# Patient Record
Sex: MALE | URBAN - METROPOLITAN AREA
[De-identification: names, ages, dates, MRNs, and addresses within clinical notes are randomized per-mention and may not be internally consistent; named-entity substitution may affect disease eponyms.]

---

## 2022-03-01 ENCOUNTER — INPATIENT (INPATIENT)
Facility: HOSPITAL | Age: 22
LOS: 5 days | Discharge: HOME | DRG: 885 | End: 2022-03-07
Attending: PSYCHIATRY & NEUROLOGY | Admitting: PSYCHIATRY & NEUROLOGY
Payer: COMMERCIAL

## 2022-03-01 VITALS
OXYGEN SATURATION: 95 % | TEMPERATURE: 98 F | RESPIRATION RATE: 17 BRPM | WEIGHT: 175.05 LBS | DIASTOLIC BLOOD PRESSURE: 75 MMHG | HEART RATE: 112 BPM | SYSTOLIC BLOOD PRESSURE: 134 MMHG

## 2022-03-01 DIAGNOSIS — F39 UNSPECIFIED MOOD [AFFECTIVE] DISORDER: ICD-10-CM

## 2022-03-01 LAB
ALBUMIN SERPL ELPH-MCNC: 4.6 G/DL — SIGNIFICANT CHANGE UP (ref 3.3–5)
ALP SERPL-CCNC: 61 U/L — SIGNIFICANT CHANGE UP (ref 40–120)
ALT FLD-CCNC: 22 U/L — SIGNIFICANT CHANGE UP (ref 10–45)
AMPHET UR-MCNC: NEGATIVE — SIGNIFICANT CHANGE UP
ANION GAP SERPL CALC-SCNC: 9 MMOL/L — SIGNIFICANT CHANGE UP (ref 5–17)
APPEARANCE UR: CLEAR — SIGNIFICANT CHANGE UP
AST SERPL-CCNC: 21 U/L — SIGNIFICANT CHANGE UP (ref 10–40)
BARBITURATES UR SCN-MCNC: NEGATIVE — SIGNIFICANT CHANGE UP
BASOPHILS # BLD AUTO: 0.03 K/UL — SIGNIFICANT CHANGE UP (ref 0–0.2)
BASOPHILS NFR BLD AUTO: 0.5 % — SIGNIFICANT CHANGE UP (ref 0–2)
BENZODIAZ UR-MCNC: NEGATIVE — SIGNIFICANT CHANGE UP
BILIRUB SERPL-MCNC: 0.5 MG/DL — SIGNIFICANT CHANGE UP (ref 0.2–1.2)
BILIRUB UR-MCNC: NEGATIVE — SIGNIFICANT CHANGE UP
BUN SERPL-MCNC: 14 MG/DL — SIGNIFICANT CHANGE UP (ref 7–23)
CALCIUM SERPL-MCNC: 9.7 MG/DL — SIGNIFICANT CHANGE UP (ref 8.4–10.5)
CHLORIDE SERPL-SCNC: 100 MMOL/L — SIGNIFICANT CHANGE UP (ref 96–108)
CO2 SERPL-SCNC: 28 MMOL/L — SIGNIFICANT CHANGE UP (ref 22–31)
COCAINE METAB.OTHER UR-MCNC: NEGATIVE — SIGNIFICANT CHANGE UP
COLOR SPEC: YELLOW — SIGNIFICANT CHANGE UP
CREAT SERPL-MCNC: 0.95 MG/DL — SIGNIFICANT CHANGE UP (ref 0.5–1.3)
DIFF PNL FLD: NEGATIVE — SIGNIFICANT CHANGE UP
EGFR: 118 ML/MIN/1.73M2 — SIGNIFICANT CHANGE UP
EOSINOPHIL # BLD AUTO: 0.05 K/UL — SIGNIFICANT CHANGE UP (ref 0–0.5)
EOSINOPHIL NFR BLD AUTO: 0.9 % — SIGNIFICANT CHANGE UP (ref 0–6)
ETHANOL SERPL-MCNC: <10 MG/DL — SIGNIFICANT CHANGE UP (ref 0–10)
GLUCOSE SERPL-MCNC: 95 MG/DL — SIGNIFICANT CHANGE UP (ref 70–99)
GLUCOSE UR QL: NEGATIVE — SIGNIFICANT CHANGE UP
HCT VFR BLD CALC: 42.3 % — SIGNIFICANT CHANGE UP (ref 39–50)
HGB BLD-MCNC: 14.3 G/DL — SIGNIFICANT CHANGE UP (ref 13–17)
IMM GRANULOCYTES NFR BLD AUTO: 0.3 % — SIGNIFICANT CHANGE UP (ref 0–1.5)
KETONES UR-MCNC: NEGATIVE — SIGNIFICANT CHANGE UP
LEUKOCYTE ESTERASE UR-ACNC: NEGATIVE — SIGNIFICANT CHANGE UP
LYMPHOCYTES # BLD AUTO: 1.47 K/UL — SIGNIFICANT CHANGE UP (ref 1–3.3)
LYMPHOCYTES # BLD AUTO: 25.2 % — SIGNIFICANT CHANGE UP (ref 13–44)
MCHC RBC-ENTMCNC: 31 PG — SIGNIFICANT CHANGE UP (ref 27–34)
MCHC RBC-ENTMCNC: 33.8 GM/DL — SIGNIFICANT CHANGE UP (ref 32–36)
MCV RBC AUTO: 91.6 FL — SIGNIFICANT CHANGE UP (ref 80–100)
METHADONE UR-MCNC: NEGATIVE — SIGNIFICANT CHANGE UP
MONOCYTES # BLD AUTO: 0.5 K/UL — SIGNIFICANT CHANGE UP (ref 0–0.9)
MONOCYTES NFR BLD AUTO: 8.6 % — SIGNIFICANT CHANGE UP (ref 2–14)
NEUTROPHILS # BLD AUTO: 3.77 K/UL — SIGNIFICANT CHANGE UP (ref 1.8–7.4)
NEUTROPHILS NFR BLD AUTO: 64.5 % — SIGNIFICANT CHANGE UP (ref 43–77)
NITRITE UR-MCNC: NEGATIVE — SIGNIFICANT CHANGE UP
NRBC # BLD: 0 /100 WBCS — SIGNIFICANT CHANGE UP (ref 0–0)
OPIATES UR-MCNC: NEGATIVE — SIGNIFICANT CHANGE UP
PCP SPEC-MCNC: SIGNIFICANT CHANGE UP
PCP UR-MCNC: NEGATIVE — SIGNIFICANT CHANGE UP
PH UR: 7 — SIGNIFICANT CHANGE UP (ref 5–8)
PLATELET # BLD AUTO: 313 K/UL — SIGNIFICANT CHANGE UP (ref 150–400)
POTASSIUM SERPL-MCNC: 4.2 MMOL/L — SIGNIFICANT CHANGE UP (ref 3.5–5.3)
POTASSIUM SERPL-SCNC: 4.2 MMOL/L — SIGNIFICANT CHANGE UP (ref 3.5–5.3)
PROT SERPL-MCNC: 7.3 G/DL — SIGNIFICANT CHANGE UP (ref 6–8.3)
PROT UR-MCNC: NEGATIVE MG/DL — SIGNIFICANT CHANGE UP
RBC # BLD: 4.62 M/UL — SIGNIFICANT CHANGE UP (ref 4.2–5.8)
RBC # FLD: 13.2 % — SIGNIFICANT CHANGE UP (ref 10.3–14.5)
SARS-COV-2 RNA SPEC QL NAA+PROBE: SIGNIFICANT CHANGE UP
SODIUM SERPL-SCNC: 137 MMOL/L — SIGNIFICANT CHANGE UP (ref 135–145)
SP GR SPEC: 1.02 — SIGNIFICANT CHANGE UP (ref 1–1.03)
THC UR QL: POSITIVE
UROBILINOGEN FLD QL: 0.2 E.U./DL — SIGNIFICANT CHANGE UP
WBC # BLD: 5.84 K/UL — SIGNIFICANT CHANGE UP (ref 3.8–10.5)
WBC # FLD AUTO: 5.84 K/UL — SIGNIFICANT CHANGE UP (ref 3.8–10.5)

## 2022-03-01 PROCEDURE — 90792 PSYCH DIAG EVAL W/MED SRVCS: CPT

## 2022-03-01 PROCEDURE — 99285 EMERGENCY DEPT VISIT HI MDM: CPT

## 2022-03-01 RX ORDER — ESCITALOPRAM OXALATE 10 MG/1
20 TABLET, FILM COATED ORAL DAILY
Refills: 0 | Status: DISCONTINUED | OUTPATIENT
Start: 2022-03-02 | End: 2022-03-07

## 2022-03-01 RX ORDER — CLONAZEPAM 1 MG
0.5 TABLET ORAL DAILY
Refills: 0 | Status: DISCONTINUED | OUTPATIENT
Start: 2022-03-01 | End: 2022-03-02

## 2022-03-01 RX ORDER — HYDROXYZINE HCL 10 MG
25 TABLET ORAL EVERY 6 HOURS
Refills: 0 | Status: DISCONTINUED | OUTPATIENT
Start: 2022-03-01 | End: 2022-03-07

## 2022-03-01 RX ORDER — INFLUENZA VIRUS VACCINE 15; 15; 15; 15 UG/.5ML; UG/.5ML; UG/.5ML; UG/.5ML
0.5 SUSPENSION INTRAMUSCULAR ONCE
Refills: 0 | Status: DISCONTINUED | OUTPATIENT
Start: 2022-03-01 | End: 2022-03-07

## 2022-03-01 RX ORDER — OLANZAPINE 15 MG/1
5 TABLET, FILM COATED ORAL AT BEDTIME
Refills: 0 | Status: DISCONTINUED | OUTPATIENT
Start: 2022-03-01 | End: 2022-03-07

## 2022-03-01 RX ORDER — ACETAMINOPHEN 500 MG
650 TABLET ORAL EVERY 6 HOURS
Refills: 0 | Status: DISCONTINUED | OUTPATIENT
Start: 2022-03-01 | End: 2022-03-07

## 2022-03-01 RX ORDER — NICOTINE POLACRILEX 2 MG
4 GUM BUCCAL EVERY 4 HOURS
Refills: 0 | Status: DISCONTINUED | OUTPATIENT
Start: 2022-03-01 | End: 2022-03-07

## 2022-03-01 RX ADMIN — OLANZAPINE 5 MILLIGRAM(S): 15 TABLET, FILM COATED ORAL at 21:31

## 2022-03-01 RX ADMIN — Medication 4 MILLIGRAM(S): at 19:14

## 2022-03-01 RX ADMIN — Medication 50 MILLIGRAM(S): at 21:33

## 2022-03-01 NOTE — ED ADULT NURSE NOTE - CHIEF COMPLAINT QUOTE
Pt brought in by parents for [facility] admission. Reports writing a suicide note last week and roommate found pt on the roof of the building. Denies hx of suicide attempts. Taking lexapro, klonopin. Reports using etoh, marijuana daily, last use last night. Reports SI at this time, denies HI/AH/VH.

## 2022-03-01 NOTE — ED BEHAVIORAL HEALTH ASSESSMENT NOTE - DESCRIPTION
Lived with parents in [place] until Sept 2021 when he moved to [facility] to attend [employer]; single, never , no children. labs, vitals, EKG, psych consult. none known

## 2022-03-01 NOTE — ED BEHAVIORAL HEALTH ASSESSMENT NOTE - HPI (INCLUDE ILLNESS QUALITY, SEVERITY, DURATION, TIMING, CONTEXT, MODIFYING FACTORS, ASSOCIATED SIGNS AND SYMPTOMS)
Mr. 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     Upon assessment, patient reports 2+ year history of depression, 1.5 year history of SI (varying plans: gun, cutting, etc; no intent), worsening SI the past 4-6 months after moving to [place] for school in Sept and the ending of a 4 year romantic relationship in October. States he was experiencing SI on a daily basis (again, with varying plans) and states "On Sunday, I just decided that was the day." He wrote a suicide note, left it for his roommate to find, and went to the roof of his dorm room with the intention of jumping to his death. His roommate found him standing on the roof ledge and stopped him. However, he states he doesn't know if he would have jumped even if his roommate hadn't stopped him. "I was thinking about my younger brother. If I die, I'm leaving him all alone." Unable to verbalize why he felt the urge to act n his SI that day. Denies any previous SA/NSSIB. Reports during the months leading up to the interrupted SA, increased anhedonia, insomnia, social isolation, and emotional numbness. "I don't know what the point of being [alive] is." While he reports continued passive SI, denies intention to harm himself while in the hospital saying "I don't want to die in a hospital."    Psych history: Past psychotherapy May 2021-August 2021 (had to stop due to moving to from [place] to [place] for school). Has seen a psychiatrist since May 2021, recently seeing Dr. Leelee Sni since November 2021. Currently adherent with home medication regimen: Lexapro 20mg PO daily (since May 2021), Zyprexa 5mg (was going to be increased to 7.5mg, but patient hadn't started new dose yet) PO qHS (since Jan 2022), and Klonopin 0.5mg PO daily prn (since Feb 2022, reports taking 5/7 days a week in the AM). Doesn't feel his medication regimen has been helping his depressive symptoms. Denies past psychiatric hospitalizations or suicide attempts.     Family Psych Hx: paternal grandfather is bipolar, required frequent hospitalizations and attempted suicide multiple times during his lifetime. Maternal grandfather has substance use issues and OCD.     Substance Use: Nicotine Vapes - [id number]; THC - daily at night to help with sleep. Reports cutting back the past two months and stopping completely a week ago; Alcohol - Since beginning of Jan (when started decreasing THC use), started drinking vodka nightly until intoxicated. Reports occasional vomiting, denies blacking out. Denies other substance use.    While patient feels he would be safe to go home, he desires help for his depression and suicidality and is amenable to inpatient admission. Mr. 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     Upon assessment, patient reports 2+ year history of depression, 1.5 year history of SI (varying plans: gun, cutting, etc; no intent), worsening SI the past 4-6 months after moving to [place] for school in Sept and the ending of a 4 year romantic relationship in October. States he was experiencing SI on a daily basis (again, with varying plans) and states "On Sunday, I just decided that was the day." He wrote a suicide note, left it for his roommate to find, and went to the roof of his dorm room with the intention of jumping to his death. His roommate found him standing on the roof ledge and stopped him. However, he states he doesn't know if he would have jumped even if his roommate hadn't stopped him. "I was thinking about my younger brother. If I die, I'm leaving him all alone." Unable to verbalize why he felt the urge to act n his SI that day. Denies any previous SA/NSSIB. Reports during the months leading up to the interrupted SA, increased anhedonia, insomnia, social isolation, and emotional numbness. "I don't know what the point of being [alive] is." While he reports continued passive SI, denies intention to harm himself while in the hospital saying "I don't want to die in a hospital."    Psych history: Past psychotherapy May 2021-August 2021 (had to stop due to moving to from [place] to [place] for school). Has seen a psychiatrist since May 2021, recently seeing Dr. Leelee Sin since November 2021. Currently adherent with home medication regimen: Lexapro 20mg PO daily (since May 2021), Zyprexa 5mg (was going to be increased to 7.5mg, but patient hadn't started new dose yet) PO qHS (since Jan 2022), and Klonopin 0.5mg PO daily prn (since Feb 2022, reports taking 5/7 days a week in the AM). Doesn't feel his medication regimen has been helping his depressive symptoms. Denies past psychiatric hospitalizations or suicide attempts.     Family Psych Hx: paternal grandfather is bipolar, required frequent hospitalizations and attempted suicide multiple times during his lifetime. Maternal grandfather has substance use issues and OCD.     Substance Use: Nicotine Vapes - [id number]; THC - daily at night to help with sleep. Reports cutting back the past two months and stopping completely a week ago; Alcohol - Since beginning of Jan (when started decreasing THC use), started drinking vodka nightly until intoxicated. Reports occasional vomiting, denies blacking out. Denies other substance use.    Collateral: Patient gives verbal consent to speak with parents, psychiatrist, and [facility] mental health center. Writer spoke with patient's parents (Jovan and Marina) with patient's permission. The report knowing of their son's struggles with depression and confirm he had presented as increasingly depressed after his break up with girlfriend of four years this past fall. However, they were shocked to learn of his recent interrupted suicide attempt. Denied having previous safety concerns or knowledge of other past self harm behavior. Reported patient had medical marijuana card and smoked frequently, but that he was cutting back at the suggestion of his psychiatrist Dr. Sin.     PA student called Dr. Sin ([phone]) and left a voicemail.    Writer spoke with Lucia Wynn at [facility] ([phone]) regarding patient referral. Patient required to come to hospital for assessment prior to returning to college dormitory.     While patient feels he would be safe to go home, he desires help for his depression and suicidality and is amenable to inpatient admission.

## 2022-03-01 NOTE — ED PROVIDER NOTE - CLINICAL SUMMARY MEDICAL DECISION MAKING FREE TEXT BOX
Pt hemodynamically stable. No medical complaints. Reports SI with thwarted attempt 1wk ago. Accompanied by parents today. Placed on 1:1. Labs unremarkable. Psychiatry consulted. Will admit to Psychiatry. Pt hemodynamically stable. No medical complaints. Reports SI with thwarted attempt 1wk ago. Accompanied by parents today. Placed on 1:1. Labs unremarkable. COVID neg. Psychiatry consulted. Will admit to Psychiatry.

## 2022-03-01 NOTE — ED ADULT NURSE NOTE - HPI (INCLUDE ILLNESS QUALITY, SEVERITY, DURATION, TIMING, CONTEXT, MODIFYING FACTORS, ASSOCIATED SIGNS AND SYMPTOMS)
22 y/o male came in with suicidal attempt last week, "I wanted to jump off the roof of my building, that was my plan, my roommate found me. this was my first attempt to do anything like this." As per mom, "a suicide note was written." pt denies hallucinations (auditory and visual), Pt has hx of depression and anxiety.

## 2022-03-01 NOTE — ED BEHAVIORAL HEALTH ASSESSMENT NOTE - THOUGHT ASSOCIATIONS
The patient has been re-examined and I agree with the above assessment or I updated with my findings. Normal

## 2022-03-01 NOTE — ED PROVIDER NOTE - NS ED ROS FT
Constitutional: No fever. No chills.  Eyes: No redness. No discharge. No vision change.   ENT: No sore throat. No ear pain.  Cardiovascular: No chest pain. No leg swelling.  Respiratory: No cough. No shortness of breath.  GI: No abdominal pain. No vomiting. No diarrhea.   MSK: No joint pain. No back pain.   Skin: No rash. No abrasions.   Neuro: No numbness. No weakness.   Psych: +anxiety. +depression. +SI. No HI or AVH.

## 2022-03-01 NOTE — BH PATIENT PROFILE - FALL HARM RISK - UNIVERSAL INTERVENTIONS
Bed in lowest position, wheels locked, appropriate side rails in place/Call bell, personal items and telephone in reach/Instruct patient to call for assistance before getting out of bed or chair/Non-slip footwear when patient is out of bed/[place] to call system/Physically safe environment - no spills, clutter or unnecessary equipment/Purposeful Proactive Rounding/Room/bathroom lighting operational, light cord in reach

## 2022-03-01 NOTE — ED ADULT TRIAGE NOTE - CHIEF COMPLAINT QUOTE
Pt brought in by parents for [facility] admission. Reports writing a suicide note last week and roommate found pt on the roof of the building. Denies hx of suicide attempts. Taking lexapro, klonopin. Reports using etoh, marijuana daily. Reports SI at this time, denies HI/AH/VH. Pt brought in by parents for [facility] admission. Reports writing a suicide note last week and roommate found pt on the roof of the building. Denies hx of suicide attempts. Taking lexapro, klonopin. Reports using etoh, marijuana daily, last use last night. Reports SI at this time, denies HI/AH/VH.

## 2022-03-01 NOTE — ED ADULT NURSE REASSESSMENT NOTE - NS ED NURSE REASSESS COMMENT FT1
pt assessed, pt A&Ox4, pt denies any suicidal thoughts at this time, 1:1 continued, safety and comfort maintained, will continue to monitor.

## 2022-03-01 NOTE — ED BEHAVIORAL HEALTH ASSESSMENT NOTE - SUMMARY
Mr. 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    RECOMMENDATIONS  -- Pending negative COVID PCR, admit voluntary to [facility].  -- Restarted home medications, consider changing due to poor efficacy per patient.   -- CIWA protocol due to daily drinking  -- No 1:1 needed on [facility]; maintain while in ED. Mr. 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    Patient describes history of depression and chronic suicidality which worsened the past six months r/t life stressors (move out of his parents' house, romantic break up). High lethality of suicide plan (jumping from dorm roof) and largely unknown factor that motivated the patient to act on his chronic suicidality that day is concerning. While patient is reportedly adherent with medication regimen, depressive symptoms are not well controlled. Patient considered to be at a high risk of harm to self and would benefit from inpatient admission for safety, symptoms management, and medication optimization. Patient amenable to be admitted voluntarily.     RECOMMENDATIONS  -- Pending negative COVID PCR, admit voluntary to [facility].  -- Restarted home medications, consider changing per primary team due to poor efficacy.   -- CIWA protocol due to daily drinking.  -- NRT due to heavy nicotine use (vapes [id number]).  -- No 1:1 needed on [facility]; maintain while in ED.  -- Gain collateral from outpatient psychiatrist Dr. Sin ([phone]).

## 2022-03-01 NOTE — ED PROVIDER NOTE - OBJECTIVE STATEMENT
21yoM with pmhx of anxiety and depression presents with increased depression and SI. 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 He was found by his roommate. He reports increased depression and SI w/ plan (slit wrists, go to forest with gun) over past 4mo since breaking up with his girlfriend. He denies AVH or HI. No prior SA. He was previously followed by a psychiatrist and therapist in [place], now seeing a psychiatrist in [facility]. Currently on klonopin only. He reports daily marijuana use, 1-2 etoh beverages and vaping.

## 2022-03-01 NOTE — ED BEHAVIORAL HEALTH ASSESSMENT NOTE - CASE SUMMARY
atraumatic
20yo man, student at [employer], with a history of depression and anxiety, alcohol and cannabis use, no history of psychiatric admissions or suicide attempts, engaged in outpt psychiatric care, who presents referred by [facility] with escalating depression xmonths and aborted suicide attempt on 2/20. Pt is dysphoric and anxious appearing, endorsing ongoing distressing suicidal thoughts with theoretical plans, no current intent, seeking inpatient psychiatric care. Describes h/o perceptual disturbances and “paranoia” while smoking cannabis, denies other subjective experience of psychosis or any suggestive of jennie. Denies h/o alcohol withdrawal sx. Discussed process of 9.13 admission and paperwork completed. Questions answered.

## 2022-03-01 NOTE — ED BEHAVIORAL HEALTH ASSESSMENT NOTE - DOMICILE TYPE
"Chief Complaint   Patient presents with     Pain in toe and joint       Initial /80 (BP Location: Right arm, Patient Position: Sitting, Cuff Size: Adult Regular)   Pulse 63   Temp 97.9  F (36.6  C) (Tympanic)   Wt 49 kg (108 lb)   SpO2 99%   BMI 20.41 kg/m   Estimated body mass index is 20.41 kg/m  as calculated from the following:    Height as of 9/16/20: 1.549 m (5' 1\").    Weight as of this encounter: 49 kg (108 lb).  Medication Reconciliation: complete  Keeley Garvin LPN    "
Private Residence

## 2022-03-01 NOTE — ED BEHAVIORAL HEALTH ASSESSMENT NOTE - RISK ASSESSMENT
RF: male gender, psych dx, High Acute Suicide Risk high acute suicide risk  RF: male gender, psych dx, recent SI with aborted attempt, h/o substance use  modifiable RF: active alcohol and cannabis use  protective factors: family support, relationship with brother, engaged in school, in outpt  treatment

## 2022-03-01 NOTE — ED PROVIDER NOTE - PHYSICAL EXAMINATION
VITAL SIGNS: I have reviewed nursing notes and confirm.  CONSTITUTIONAL: Well-developed; in no acute distress.   SKIN:  warm and dry, no acute rash.   HEAD:  normocephalic, atraumatic.  EYES: PERRL, EOM intact; conjunctiva and sclera clear.  ENT: No nasal discharge; airway clear.   NECK: Supple; non tender.  CARD: S1, S2 normal; no murmurs, gallops, or rubs. Regular rate and rhythm.   RESP:  Clear to auscultation b/l, no wheezes, rales or rhonchi.  ABD: Normal bowel sounds; soft; non-distended; non-tender; no guarding/ rebound.  EXT: Normal ROM. No clubbing, cyanosis or edema. 2+ pulses to b/l ue/le.  NEURO: Alert, oriented, grossly unremarkable  PSYCH: Cooperative, flat affect, depressed mood.

## 2022-03-01 NOTE — ED PROVIDER NOTE - ATTENDING CONTRIBUTION TO CARE
21 M hx anxiety depression p/w SI with thwarted attempt 1 week ago climbing to roof.  Pt is student at [employer].  New breakup with GF months ago.  No HI.  On Klonopin and drinks ETOH daily, uses marijuana and vapes.  Affect flat, depressed appearing.  VSS, no acute medical condition apparent.  Will obtain medical clearance labs and consult psych.

## 2022-03-02 PROCEDURE — 99223 1ST HOSP IP/OBS HIGH 75: CPT

## 2022-03-02 RX ORDER — CLONAZEPAM 1 MG
0.5 TABLET ORAL
Refills: 0 | Status: DISCONTINUED | OUTPATIENT
Start: 2022-03-02 | End: 2022-03-07

## 2022-03-02 RX ADMIN — Medication 0.5 MILLIGRAM(S): at 21:54

## 2022-03-02 RX ADMIN — ESCITALOPRAM OXALATE 20 MILLIGRAM(S): 10 TABLET, FILM COATED ORAL at 13:38

## 2022-03-02 RX ADMIN — OLANZAPINE 5 MILLIGRAM(S): 15 TABLET, FILM COATED ORAL at 21:54

## 2022-03-02 NOTE — BH INPATIENT PSYCHIATRY ASSESSMENT NOTE - NSBHCHARTREVIEWVS_PSY_A_CORE FT
Vital Signs Last 24 Hrs  T(C): 36.4 (03-02-22 @ 09:09), Max: 37 (03-01-22 @ 18:40)  T(F): 97.5 (03-02-22 @ 09:09), Max: 98.6 (03-01-22 @ 18:40)  HR: 51 (03-02-22 @ 09:09) (51 - 76)  BP: 100/49 (03-02-22 @ 09:09) (100/49 - 132/79)  BP(mean): --  RR: 18 (03-02-22 @ 06:00) (17 - 18)  SpO2: 99% (03-02-22 @ 09:09) (97% - 99%)     Vital Signs Last 24 Hrs  T(C): 36.7 (03-02-22 @ 16:34), Max: 37 (03-01-22 @ 18:40)  T(F): 98.1 (03-02-22 @ 16:34), Max: 98.6 (03-01-22 @ 18:40)  HR: 63 (03-02-22 @ 16:34) (51 - 76)  BP: 119/73 (03-02-22 @ 16:34) (100/49 - 132/79)  BP(mean): --  RR: 18 (03-02-22 @ 16:34) (17 - 18)  SpO2: 99% (03-02-22 @ 16:34) (97% - 99%)     Vital Signs Last 24 Hrs  T(C): 36.6 (03-02-22 @ 20:11), Max: 36.7 (03-02-22 @ 16:34)  T(F): 97.8 (03-02-22 @ 20:11), Max: 98.1 (03-02-22 @ 16:34)  HR: 66 (03-02-22 @ 20:11) (51 - 76)  BP: 123/78 (03-02-22 @ 20:11) (100/49 - 123/78)  BP(mean): --  RR: 18 (03-02-22 @ 20:11) (18 - 18)  SpO2: 100% (03-02-22 @ 20:11) (98% - 100%)

## 2022-03-02 NOTE — BH PSYCHOLOGY - ADMISSION/NEUROPSYCHOLOGY NOTE - NSBHPSYCHOLID_PSY_A_CORE FT
Pt is a 22yo White cisgender male, heterosexual, student in [employer] at [employer], domiciled with roommate in apartment, past MHx of 4 concussions, currently hospitalized for an interrupted SA by jumping from the roof of his building in the context of increased depression and anxiety; PPHx of depression and anxiety as well as cannabis and alcohol use, no prior SA.    Pt seen during admission with team and later 1:1.

## 2022-03-02 NOTE — BH INPATIENT PSYCHIATRY ASSESSMENT NOTE - NSBHMETABOLIC_PSY_ALL_CORE_FT
BMI: BMI (kg/m2): 25 (03-01-22 @ 18:40)  HbA1c:   Glucose:   BP: 100/49 (03-02-22 @ 09:09) (100/49 - 134/75)  Lipid Panel:  BMI: BMI (kg/m2): 25 (03-01-22 @ 18:40)  HbA1c:   Glucose:   BP: 119/73 (03-02-22 @ 16:34) (100/49 - 134/75)  Lipid Panel:  BMI: BMI (kg/m2): 25 (03-01-22 @ 18:40)  HbA1c:   Glucose:   BP: 123/78 (03-02-22 @ 20:11) (100/49 - 134/75)  Lipid Panel:

## 2022-03-02 NOTE — BH INPATIENT PSYCHIATRY ASSESSMENT NOTE - HPI (INCLUDE ILLNESS QUALITY, SEVERITY, DURATION, TIMING, CONTEXT, MODIFYING FACTORS, ASSOCIATED SIGNS AND SYMPTOMS)
Patient is a 21 year old male from [place] currently enrolled at [employer] with a PPHx of depression and anxiety admitted to the unit following ED presentation at the recommendation of his [facility] mental health program. The patient reports that he had a suicide attempt last week - he wrote a note that he left in his dorm room and went to the roof of his building with the intent to jump. He was hesitant and his roommate found the note and came to the roof to stop him. Patient states that this was a more impulsive, "day of" decision, and he had thought of the plan to jump from his building prior but woke up in the morning with the plan to do it that day. The patient endorses that he has had suicidal ideation since May 2021 but it has been worse in the past 2 months. In the past, he has made a plan but has no prior attempts. He reports that he once told a close friend of his suicidal intent and plan and the friend "told him not to do it" but did not report to anyone else. The patient denies a history of manic symptoms and auditory or visual hallucinations. He currently feels that he would be safe to leave the hospital and that he would not attempt suicide again.    The patient reports a 2 year history of depression that has been worsening over the past 2-3 months. He has been feeling "numb" with depressed mood, decreased interest levels, low energy, decreased appetite, and difficulty focusing on school and managing school work and deadlines. He also reports trouble sleeping and that it often takes 1-2 hours for him to fall asleep due to anxiety and "racing thoughts." Patient endorses a history of anxiety for his "whole life" that was worst in high school but is still significant. He reports that his anxiety is worst right when he wakes up and in the evening. He endorses significant social anxiety that causes him to have difficulty connecting with people and to avoid social settings like parties. He started seeing a psychiatrist and therapist in [place] in 05/2021 who he saw until he moved to [place] for [employer] in 09/2021. The patient was started on Lexapro 5mg 05/2021 and endorses that it helped for 2-3 months but he feels it has stopped working despite dosage increases to 10mg then 20mg. At that time he was also prescribed Zyprexa for his sleep difficulties, which helps somewhat, and buspirone for his anxiety, which he did not feel was helpful. Upon moving to [place] the patient started seeing a new psychiatrist who continued the Lexapro and Zyprexa and discontinued the buspirone. He did not seek a new therapist as he did not enjoy his sessions in [place] and felt they made his anxiety and depression worse, though he has attended counseling with his college counseling program. It was the college counseling program who recommended that he come to the hospital he believes for "liability reasons."    The patient is from [facility] and reports a strained relationship with his parents, who he describes as "complacent, angry, and only thinking about themselves" and who he believes take their anger out on him. He has one younger brother, 16 years old, who he is very close to and wants to be there to support. He reports that wanting to be there for his brother is one of the main reasons he could not go through with his suicide attempt. His medical history is significant for 4 concussions (3 of which involved presentation to the ED) - 1 from being hit in the head with a baseball, 2 from playing flag football, and 1 other. Patient also reports that he was "hyperactive" as a child and was evaluated for ADHD but was not given a formal diagnosis and was never treated with medication. His family history is significant for bipolar disorder in his paternal grandfather who has made 3 suicide attempts, and substance abuse (opioids and alcohol) and OCD in his maternal grandfather.    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 Patient endorses that the program is high pressure with a demanding workload - the environment makes it difficult to become close to people in the program who he describes as "superficial and in their own bubble." Though the patient has been receiving good grades, he has been struggling lately to do his work and meet deadlines. 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 Patient reports marijuana and alcohol use since high school. Before 01/2022 this fall he was smoking marijuana 6-7x/day including when he first woke up and to help him sleep. He has decreased his use to 1x/day at night. He reports drinking 6-8oz of vodka/night, which makes him feel better and more open with more energy. Patient usually drinks with other people but states that he would drink alone if no one where there. Patient endorses that he wants to stop using marijuana and alcohol and has talked with his psychiatrist about how his substance use may be affecting his symptoms. His last use was the day prior to his presentation to the ED. Patient is a 21 year old male from [place] currently enrolled at [employer] with a history of depression and anxiety admitted to the unit following ED presentation at the recommendation of his [facility] mental health program. The patient reports that he had a suicide attempt last week - he wrote a note that he left in his dorm room and went to the roof of his building with the intent to jump. He was hesitant and his roommate found the note and came to the roof to stop him. Patient states that this was a more impulsive, "day-of decision," and he had thought of the plan to jump from his building prior but woke up in the morning with the plan to do it that day. The patient endorses that he has had suicidal ideation since May 2021 but it has been worse in the past 2 months. In the past, he has made a plan but has no prior attempts. He reports that he once told a close friend of his suicidal intent and plan and the friend "told him not to do it" but did not report to anyone else. The patient denies a history of manic symptoms and auditory or visual hallucinations. He currently feels that he would be safe to leave the hospital and that he would not attempt suicide again.    The patient reports a 2 year history of depression that has been worsening over the past 2-3 months. He has been feeling "numb" with depressed mood, decreased interest levels, low energy, decreased appetite, and difficulty focusing on school and managing school work and deadlines. He also reports trouble sleeping and that it often takes 1-2 hours for him to fall asleep due to anxiety and "racing thoughts." Patient endorses a history of anxiety for his "whole life" that was worst in high school but is still significant. He reports that his anxiety is worst right when he wakes up and in the evening. He endorses significant social anxiety that causes him to have difficulty connecting with people and to avoid social settings like parties. He started seeing a psychiatrist and therapist in [place] in 05/2021 whom he saw until he moved to [place] for [employer] in 09/2021. The patient was started on Lexapro 5mg 05/2021 and endorses that it helped for 2-3 months but he feels it has stopped working despite dosage increases to 10mg then 20mg. In 05/2021 he was also prescribed Zyprexa for his sleep difficulties, which helps somewhat, and buspirone for his anxiety, which he did not feel was helpful. Upon moving to [place] the patient started seeing a new psychiatrist who continued the Lexapro and Zyprexa and discontinued the buspirone. He did not seek a new therapist as he did not enjoy his sessions in [place] and felt they made his anxiety and depression worse, though he has attended counseling with his college counseling program. It was the college counseling program who recommended that he come to the hospital he believes for "liability reasons."    The patient is from [facility] and reports a strained relationship with his parents, whom he describes as "complacent, angry, and only thinking about themselves" and who he believes take their anger out on him. He has one younger brother, 16 years old, whom he is very close to and wants to be there to support. He reports that wanting to be there for his brother is one of the main reasons he could not go through with his interrupted suicide attempt. His medical history is significant for 4 concussions (3 of which involved presentation to the ED) - 1 from being hit in the head with a baseball, 2 from playing flag football, and 1 other. Patient also reports that he was "hyperactive" as a child and was evaluated for ADHD but was not given a formal diagnosis and was never treated with medication. His family history is significant for bipolar disorder and 3 suicide attempts in his paternal grandfather, and substance abuse (opioids and alcohol) and OCD in his maternal grandfather. He was not raised in a specific Tenriism tradition and does not associate with specific Tenriism or spiritual groups now.    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 Patient endorses that the program is high pressure with a demanding workload - the environment makes it difficult to become close to people in the program who he describes as "superficial and in their own bubble." Though the patient has been receiving good grades, he has been struggling lately to do his work and meet deadlines. 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 Patient reports marijuana and alcohol use since high school. Before 01/2022 this fall he was smoking marijuana 6-7x/day including when he first woke up and to help him sleep. He has decreased his use to 1x/day at night. He reports drinking 6-8oz of vodka/night, which makes him feel better and more open with more energy. Patient usually drinks with other people but states that he would drink alone if no one where there. Patient endorses that he wants to stop using marijuana and alcohol and has talked with his psychiatrist about how his substance use may be affecting his symptoms. His last use was the day prior to his presentation to the ED. Patient is a 21 year old male from [place] currently enrolled at "[employer] with a history of depression and anxiety admitted to the unit following ED presentation at the recommendation of his [facility] mental health program. The patient reports that he had a suicide attempt last week - he wrote a note that he left in his dorm room and went to the roof of his building with the intent to jump. He was hesitant and his roommate found the note and came to the roof to stop him. Patient states that this was a more impulsive, "day-of decision," and he had thought of the plan to jump from his building prior but woke up in the morning with the plan to do it that day. The patient endorses that he has had suicidal ideation since May 2021, but it has been worse in the past 2 months. In the past, he has had a plan but has no prior attempts. He reports that he once told a close friend of his suicidal intent and plan and the friend "told him not to do it" but did not report to anyone else. The patient denies a history of manic symptoms and auditory or visual hallucinations. He currently feels that he would be safe to leave the hospital and that he would not attempt suicide again.    The patient reports a 2-year history of depression that has been worsening over the past 2-3 months. He has been feeling "numb" with depressed mood, decreased interest levels, low energy, decreased appetite, and difficulty focusing on school and managing schoolwork and deadlines. He also reports trouble sleeping and that it often takes 1-2 hours for him to fall asleep due to anxiety and "racing thoughts."    Patient endorses a history of anxiety for his "whole life" that was worst in high school but is still significant. He reports that his anxiety is at its height right when he wakes up and in the evening. He endorses significant social anxiety that causes him to have difficulty connecting with people and to avoid social settings like parties. He started seeing a psychiatrist and therapist in [place] in 05/2021 whom he saw until he moved to [place] for [employer] in 09/2021. The patient was started on Lexapro 5mg 05/2021 and endorses that it helped for 2-3 months but he feels it has stopped working despite dosage increases to 10mg then 20mg. In 05/2021 he was also prescribed Zyprexa 5mg qpm for his sleep difficulties, which helps only a little, and buspirone for his anxiety, which he did not feel was helpful. Upon moving to [place] the patient started seeing a new psychiatrist (Nova Huerta, [phone]) who continued the Lexapro and Zyprexa and discontinued the buspirone. He did not seek a new therapist as he did not enjoy his sessions in [place] and felt they made his anxiety and depression worse, though he has attended counseling with his college counseling program. It was the college counseling program who recommended that he come to the hospital he believes for "liability reasons."    The patient is from [facility] and reports a strained relationship with his parents, whom he describes as "complacent, angry about everything, and only thinking about themselves" and who he believes take their anger out on him. He has one younger brother, 16 years old, whom he is very close to and wants to be there to support. He reports that wanting to be there for his brother is one of the main reasons he could not go through with his interrupted suicide attempt. His medical history is significant for 4 concussions (3 of which involved presentation to the ED) - 1 from being hit in the head with a baseball, 2 from playing flag football, and 1 other. Patient also reports that he was "hyperactive" as a child and was evaluated for ADHD but was not given a formal diagnosis and was never treated with medication. His family history is significant for bipolar disorder and 3 suicide attempts in his paternal grandfather, and substance abuse (opioids and alcohol) and OCD in his maternal grandfather. He was not raised in a specific Anabaptist tradition and does not associate with specific Anabaptist or spiritual groups now.    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 Patient endorses that the program is high pressure with a demanding workload - the environment makes it difficult to become close to people in the program who he describes as "superficial and in their own bubble." He feels lonely. Though the patient has been receiving good grades, he has been struggling lately to do his work and meet deadlines. 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 Patient reports marijuana and alcohol use since high school. Before 01/2022 this fall he was smoking marijuana 6-7x/day including when he first woke up and to help him sleep. He has decreased his use to 1x/day at night. He reports drinking 6-8oz of vodka/night, which makes him feel better and more open with more energy. Patient usually drinks with other people but states that he would drink alone if no one where there. Patient endorses that he wants to stop using marijuana and alcohol and has talked with his psychiatrist about how his substance use may be affecting his symptoms. His last use was the day prior to his presentation to the ED.    Collateral:  Attempt was made to contact Dr. Huerta. Left voice mail.

## 2022-03-02 NOTE — BH INPATIENT PSYCHIATRY ASSESSMENT NOTE - NSBHCONSBHPROVDETAILS_PSY_A_CORE  FT
Detail Level: Simple Additional Notes: Patient is on testosterone supplement 2/2 hypophysitis; avoiding finasteride. Continue minoxidil. outpatient psychiatrist called, unavailable Render Risk Assessment In Note?: yes

## 2022-03-02 NOTE — BH INPATIENT PSYCHIATRY ASSESSMENT NOTE - NSTXDEPRESINTERMD_PSY_ALL_CORE
Psychopharm management 15 minutes daily  Lexapro 20mg, Klon 1mg/day, Zyprexa 5mg nightly  Will encourage exploration of his feelings and motivation, developing healthy coping strategies not involving THC or alcohol

## 2022-03-02 NOTE — BH SOCIAL WORK INITIAL PSYCHOSOCIAL EVALUATION - NSBHHOUSECOMMENTFT_PSY_ALL_CORE
Patient lives with his family in [place] but is currently residing in the dorms at [employer] [employer] [facility]

## 2022-03-02 NOTE — BH PSYCHOLOGY - ADMISSION/NEUROPSYCHOLOGY NOTE - NSBHSUICIDERISKFT_PSY_ALL_CORE
Acute suicide risk is moderate to high, since pt reports no current SI but has a recent SA which was impulsive. Chronic risk is moderate as pt made an SA but is willing to engage in tx.  Static factors: recent SA, hx of depression and anxiety, hx of substance use, FPHx of bipolar and substance use and SAs  Modifiable factors: current mood episode, current mood episode, anxiety, lack of coping skills, strained relationship with parents  Protective factors: responsibility towards family (brother), engagement in school, future oriented

## 2022-03-02 NOTE — BH INPATIENT PSYCHIATRY ASSESSMENT NOTE - NSBHASSESSSUMMFT_PSY_ALL_CORE
Patient is a 20 yo M from [place] recently enrolled at [employer] with a history of depression and anxiety admitted following an interrupted suicide attempt last week. He reports a 2 year history of depression poorly managed with Lexapro and a lifelong history of anxiety. Patient endorses worsening depression over the past 2-3 months and passive and active SI since 05/2021 with no prior attempts. He has a history of heavy marijuana use and current marijuana and alcohol use. Patient presenting with depression and anxious mood with notable psychomotor agitation and discomfort.    Plan:  - continue current medication  - collect collateral from outpatient psychiatrist Patient is a 22 yo M from [place] recently enrolled at [employer] with a history of depression and anxiety admitted following an interrupted suicide attempt last week. He reports a 2 year history of depression poorly managed with Lexapro and a lifelong history of anxiety. Patient endorses worsening depression over the past 2-3 months and passive and active SI since 05/2021 with no prior attempts. He has a history of heavy marijuana use and current marijuana and alcohol use. Patient presenting with depression and anxious mood with notable psychomotor agitation and discomfort.    Plan:  - Continue home Lexapro 20mg daily for depression/anxiety  - Continue home Zyprexa 5mg nightly for sleep  - Raise Klonopin to 1mg daily for anxiety  - Collect collateral from outpatient psychiatrist, family   Patient is a 20 yo M from [place] recently enrolled at [employer] with a history of depression and anxiety admitted following an interrupted suicide attempt last week. He reports a 2 year history of depression poorly managed with Lexapro and a lifelong history of anxiety. Patient endorses worsening depression over the past 2-3 months and passive and active SI since 05/2021 with no prior attempts. He has a history of heavy marijuana use and current marijuana and alcohol use. Patient presenting with depression and anxious mood with notable psychomotor agitation and discomfort.    Plan:  - Continue home Lexapro 20mg daily for depression/anxiety  - Continue home Zyprexa 5mg nightly for sleep  - Raise Klonopin to 0.5mg BID for anxiety  - Collect collateral from outpatient psychiatrist, family

## 2022-03-02 NOTE — BH SOCIAL WORK INITIAL PSYCHOSOCIAL EVALUATION - DETAILS
Paternal grandfather had opioid and alcohol substance abuse Patient reported his paternal grandfather has bipolar disorder with three suicide attempts and substance use disorder (opioids and alcohol). Patient also reports his maternal grandfather has OCD.

## 2022-03-02 NOTE — CHART NOTE - NSCHARTNOTEFT_GEN_A_CORE
[facility]  PHYSICAL EXAM: Agree/Declined    VITALS: T(C): 36.6 (03-02-22 @ 06:00), Max: 37 (03-01-22 @ 18:40)  HR: 76 (03-02-22 @ 06:00) (64 - 112)  BP: 100/65 (03-02-22 @ 06:00) (100/65 - 134/75)  RR: 18 (03-02-22 @ 06:00) (17 - 18)  SpO2: 98% (03-02-22 @ 06:00) (95% - 98%)      GENERAL: NAD, comfortable, ambulating  HEAD:  Atraumatic, Normocephalic  EYES: EOMI, PERRLA, conjunctiva and sclera clear  ENT: Moist mucous membranes  NECK: Supple, No JVD  CHEST/LUNG: Clear to auscultation bilaterally; No rales, rhonchi, wheezing, or rubs. Unlabored respirations  HEART: Regular rate and rhythm; No murmurs, rubs, or gallops  ABDOMEN: BSx4; Soft, nontender, nondistended  EXTREMITIES:  No clubbing, cyanosis, or edema  NERVOUS SYSTEM:  A&Ox3, no focal deficits   SKIN: No rashes or lesions [facility]  PHYSICAL EXAM: Agree/Declined    VITALS: T(C): 36.6 (03-02-22 @ 06:00), Max: 37 (03-01-22 @ 18:40)  HR: 76 (03-02-22 @ 06:00) (64 - 112)  BP: 100/65 (03-02-22 @ 06:00) (100/65 - 134/75)  RR: 18 (03-02-22 @ 06:00) (17 - 18)  SpO2: 98% (03-02-22 @ 06:00) (95% - 98%)      GENERAL: NAD, comfortable, ambulating  HEAD:  Atraumatic, Normocephalic  EYES: EOMI, PERRLA, conjunctiva and sclera clear  ENT: Moist mucous membranes, no asymmetry, no trauma or lesions  NECK: Supple, No JVD, FROM  CHEST/LUNG: Clear to auscultation bilaterally; No rales, rhonchi, wheezing, or rubs. Unlabored respirations  HEART: Regular rate and rhythm; No murmurs, rubs, or gallops  ABDOMEN: BSx4; Soft, nontender, nondistended  EXTREMITIES:  No clubbing, cyanosis, or edema, FROM  NERVOUS SYSTEM:  A&Ox3, CNs in tact, strenght 5/5 b/l LE and UE, sensation intact  SKIN: No rashes or lesions PHYSICAL EXAM    VITALS: T(C): 36.6 (03-02-22 @ 06:00), Max: 37 (03-01-22 @ 18:40)  HR: 76 (03-02-22 @ 06:00) (64 - 112)  BP: 100/65 (03-02-22 @ 06:00) (100/65 - 134/75)  RR: 18 (03-02-22 @ 06:00) (17 - 18)  SpO2: 98% (03-02-22 @ 06:00) (95% - 98%)      GENERAL: NAD, comfortable, ambulating  HEAD:  Atraumatic, Normocephalic  EYES: EOMI, PERRLA, conjunctiva and sclera clear  ENT: Moist mucous membranes, no asymmetry, no trauma or lesions  NECK: Supple, No JVD, FROM  CHEST/LUNG: Clear to auscultation bilaterally; No rales, rhonchi, wheezing, or rubs. Unlabored respirations  HEART: Regular rate and rhythm; No murmurs, rubs, or gallops  ABDOMEN: BSx4; Soft, nontender, nondistended  EXTREMITIES:  No clubbing, cyanosis, or edema, FROM  NERVOUS SYSTEM:  A&Ox3, CNs in tact, strenght 5/5 b/l LE and UE, sensation intact  SKIN: No rashes or lesions    Reviewed by Dr. mahajan

## 2022-03-02 NOTE — BH PSYCHOLOGY - ADMISSION/NEUROPSYCHOLOGY NOTE - HPI (INCLUDE ILLNESS QUALITY, SEVERITY, DURATION, TIMING, CONTEXT, MODIFYING FACTORS, ASSOCIATED SIGNS AND SYMPTOMS)
Patient is a 21 year old male from [place] currently enrolled at [employer] with a history of depression and anxiety admitted to the unit following ED presentation at the recommendation of his [facility] mental health program. The patient reports that he had a suicide attempt last week - he wrote a note that he left in his dorm room and went to the roof of his building with the intent to jump. He was hesitant and his roommate found the note and came to the roof to stop him. Patient states that this was a more impulsive, "day-of decision," and he had thought of the plan to jump from his building prior but woke up in the morning with the plan to do it that day. The patient endorses that he has had suicidal ideation since May 2021 but it has been worse in the past 2 months. In the past, he has made a plan but has no prior attempts. He reports that he once told a close friend of his suicidal intent and plan and the friend "told him not to do it" but did not report to anyone else. The patient denies a history of manic symptoms and auditory or visual hallucinations. He currently feels that he would be safe to leave the hospital and that he would not attempt suicide again.    The patient reports a 2 year history of depression that has been worsening over the past 2-3 months. He has been feeling "numb" with depressed mood, decreased interest levels, low energy, decreased appetite, and difficulty focusing on school and managing school work and deadlines. He also reports trouble sleeping and that it often takes 1-2 hours for him to fall asleep due to anxiety and "racing thoughts." Patient endorses a history of anxiety for his "whole life" that was worst in high school but is still significant. He reports that his anxiety is worst right when he wakes up and in the evening. He endorses significant social anxiety that causes him to have difficulty connecting with people and to avoid social settings like parties. He started seeing a psychiatrist and therapist in [place] in 05/2021 whom he saw until he moved to [place] for [employer] in 09/2021. The patient was started on Lexapro 5mg 05/2021 and endorses that it helped for 2-3 months but he feels it has stopped working despite dosage increases to 10mg then 20mg. In 05/2021 he was also prescribed Zyprexa for his sleep difficulties, which helps somewhat, and buspirone for his anxiety, which he did not feel was helpful. Upon moving to [place] the patient started seeing a new psychiatrist who continued the Lexapro and Zyprexa and discontinued the buspirone. He did not seek a new therapist as he did not enjoy his sessions in [place] and felt they made his anxiety and depression worse, though he has attended counseling with his college counseling program. It was the college counseling program who recommended that he come to the hospital he believes for "liability reasons."    The patient is from [place] and reports a strained relationship with his parents, whom he describes as "complacent, angry, and only thinking about themselves" and who he believes take their anger out on him. He has one younger brother, 16 years old, whom he is very close to and wants to be there to support. He reports that wanting to be there for his brother is one of the main reasons he could not go through with his interrupted suicide attempt. His medical history is significant for 4 concussions (3 of which involved presentation to the ED) - 1 from being hit in the head with a baseball, 2 from playing flag football, and 1 other. Patient also reports that he was "hyperactive" as a child and was evaluated for ADHD but was not given a formal diagnosis and was never treated with medication. His family history is significant for bipolar disorder and 3 suicide attempts in his paternal grandfather, and substance abuse (opioids and alcohol) and OCD in his maternal grandfather. He was not raised in a specific Nondenominational tradition and does not associate with specific Nondenominational or spiritual groups now.    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 Patient endorses that the program is high pressure with a demanding workload - the environment makes it difficult to become close to people in the program who he describes as "superficial and in their own bubble." Though the patient has been receiving good grades, he has been struggling lately to do his work and meet deadlines. 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 Patient reports marijuana and alcohol use since high school. Before 01/2022 this fall he was smoking marijuana 6-7x/day including when he first woke up and to help him sleep. He has decreased his use to 1x/day at night. He reports drinking 6-8oz of vodka/night, which makes him feel better and more open with more energy. Patient usually drinks with other people but states that he would drink alone if no one where there. Patient endorses that he wants to stop using marijuana and alcohol and has talked with his psychiatrist about how his substance use may be affecting his symptoms. His last use was the day prior to his presentation to the ED.paternal grandfather - bipolar disorder, suicide attempts x3  maternal grandfather - substance abuse (opioids, alcohol), OCDPatient is a 22 yo M from [place] recently enrolled at [employer] with a history of depression and anxiety admitted following an interrupted suicide attempt last week. He reports a 2 year history of depression poorly managed with Lexapro and a lifelong history of anxiety. Patient endorses worsening depression over the past 2-3 months and passive and active SI since 05/2021 with no prior attempts. He has a history of heavy marijuana use and current marijuana and alcohol use. Patient presenting with depression and anxious mood with notable psychomotor agitation and discomfort. Pt admitted to the unit following ED presentation at the recommendation of his [facility] mental health program. He reports that he had a suicide attempt last week - he wrote a note that he left in his dorm room and went to the roof of his building with the intent to jump. Pt's roommate found the note and came to the roof to stop him, but pt also says that he was already hesitant because of the thought of abandoning his brother (15yo). Pt states that his SA was the result of an impulsive, "day-of decision," although he had SI with plan to jump from his building since May 2021 but it has been worse in the past 2 months. In the past, he has made a plan but has no prior attempts. He reports that he once told a close friend of his suicidal intent and plan and the friend "told him not to do it" but did not report to anyone else. The patient denies a history of manic symptoms and auditory or visual hallucinations. He currently feels that he would be safe to leave the hospital and that he would not attempt suicide again. Pt identifies his depression as the main trigger for his SI, which is exacerbated by his sadness and guilt related his breakup with ex-girlfriend of 4.5 y, as well as the stress of workload and deadlines related to school, as elaborated upon below. On the day of the attempt, pt received a text message from a girl he had one date with conveying that they would no longer be in touch, which may have contributed to his impulsive SI. Additionally, pt reports that he tends to ruminate, which also increases his SI.    The patient reports a 2 year history of depression that has been worsening over the past 2-3 months. He has been feeling "numb" with depressed mood, decreased interest levels, low energy, decreased appetite, and difficulty focusing on school and managing school work and deadlines, and reports some moments when his depression is attenuated. He also reports trouble sleeping and that it often takes 1-2 hours for him to fall asleep due to anxiety and "racing thoughts." Patient endorses a history of anxiety for his "whole life" that was worst in high school but is still significant. He reports that his anxiety is worst right when he wakes up and in the evening. He endorses significant social anxiety that causes him to have difficulty connecting with people and to avoid social settings like parties. He started seeing a psychiatrist and therapist in [place] in 05/2021 whom he saw until he moved to [place] for college in 09/2021. The patient was started on Lexapro 5mg 05/2021 and endorses that it helped for 2-3 months but he feels it has stopped working despite dosage increases to 10mg then 20mg. In 05/2021 he was also prescribed Zyprexa for his sleep difficulties, which helps somewhat, and buspirone for his anxiety, which he did not feel was helpful. Upon moving to [place] the patient started seeing a new psychiatrist who continued the Lexapro and Zyprexa and discontinued the buspirone. He did not seek a new therapist as he did not enjoy his sessions in [place] and felt they made his anxiety and depression worse, though he has attended counseling with his college counseling program. It was the [facility] counseling program who recommended that he come to the hospital he believes for "liability reasons."    Patient reports marijuana and alcohol use since high school. Before 01/2022 this fall he was smoking marijuana 6-7x/day including when he first woke up and to help him sleep. He has decreased his use to 1x/day at night. He reports drinking 6-8oz of vodka/night, which makes him feel better and more open with more energy. Patient usually drinks with other people but states that he would drink alone if no one where there. Patient endorses that he wants to stop using marijuana and alcohol and has talked with his psychiatrist about how his substance use may be affecting his symptoms. Pt reports that he decreased his marijuana use in the last couple of months, which may have affected his SI. His last use was the day prior to his presentation to the ED.    The patient is from [facility] and reports a strained relationship with his parents, whom he describes as "complacent, angry, and only thinking about themselves" and who he believes take their anger out on him. He reports that when he told his parents he made an SA, they were angry at him and did not understand his gesture. He has one younger brother, 16 years old, whom he is very close to and wants to be there to support. He reports that wanting to be there for his brother is one of the main reasons he could not go through with his interrupted suicide attempt. His medical history is significant for 4 concussions (3 of which involved presentation to the ED) - 1 from being hit in the head with a baseball, 2 from playing flag football, and 1 other. Patient also reports that he was "hyperactive" as a child and was evaluated for ADHD but was not given a formal diagnosis and was never treated with medication. His family history is significant for bipolar disorder and 3 suicide attempts in his paternal grandfather, and substance abuse (opioids and alcohol) and OCD in his maternal grandfather. He was not raised in a specific Rastafarian tradition and does not associate with specific Rastafarian or spiritual groups now.    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 Patient endorses that the program is high pressure with a demanding workload - the environment makes it difficult to become close to people in the program who he describes as "superficial and in their own bubble." Though the patient has been receiving good grades, he has been struggling lately to do his work and meet deadlines. 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     Pt denies jennie, AH/VH, hx of trauma

## 2022-03-02 NOTE — BH INPATIENT PSYCHIATRY ASSESSMENT NOTE - VIOLENCE PROTECTIVE FACTORS:
Residential stability/Relationship stability/Employment stability/Engagement in treatment Residential stability/Employment stability/Engagement in treatment

## 2022-03-02 NOTE — BH INPATIENT PSYCHIATRY ASSESSMENT NOTE - CASE SUMMARY
Pt in college program and is at [employer]. Had suicidal ideation with plan to jump off roof and wrote a note he left for loved ones and roommate. Lexapro 20 mg and zyprexa have not been fully helpful. Pt very much wants discharge from inpatient unit as soon as possible. Has outpt psychiatrist but no therapist. Recent breakup, and stresses at school have been stressors.

## 2022-03-02 NOTE — BH SOCIAL WORK INITIAL PSYCHOSOCIAL EVALUATION - NSPTSTATEDGOAL_PSY_ALL_CORE
Patient wants to be connected with a female therapist and obtain stabilization through medication management

## 2022-03-02 NOTE — BH INPATIENT PSYCHIATRY ASSESSMENT NOTE - CURRENT MEDICATION
MEDICATIONS  (STANDING):  escitalopram 20 milliGRAM(s) Oral daily  influenza   Vaccine 0.5 milliLiter(s) IntraMuscular once  OLANZapine 5 milliGRAM(s) Oral at bedtime    MEDICATIONS  (PRN):  acetaminophen     Tablet .. 650 milliGRAM(s) Oral every 6 hours PRN Temp greater or equal to 38C (100.4F), Mild Pain (1 - 3), Moderate Pain (4 - 6)  aluminum hydroxide/magnesium hydroxide/simethicone Suspension 30 milliLiter(s) Oral every 4 hours PRN Dyspepsia  chlordiazePOXIDE 50 milliGRAM(s) Oral every 4 hours PRN CIWA-Ar score 8 or greater (first line)  clonazePAM  Tablet 0.5 milliGRAM(s) Oral daily PRN anxiety  hydrOXYzine hydrochloride 25 milliGRAM(s) Oral every 6 hours PRN Anxiety  LORazepam     Tablet 2 milliGRAM(s) Oral every 6 hours PRN CIWA-Ar score increase by 2 points and a total score of 7 or less  LORazepam     Tablet 2 milliGRAM(s) Oral every 6 hours PRN CIWA-Ar score 8 or greater (second line)  nicotine  Polacrilex Gum 4 milliGRAM(s) Oral every 4 hours PRN nicotine withdrawal   MEDICATIONS  (STANDING):  clonazePAM  Tablet 0.5 milliGRAM(s) Oral two times a day  escitalopram 20 milliGRAM(s) Oral daily  influenza   Vaccine 0.5 milliLiter(s) IntraMuscular once  OLANZapine 5 milliGRAM(s) Oral at bedtime    MEDICATIONS  (PRN):  acetaminophen     Tablet .. 650 milliGRAM(s) Oral every 6 hours PRN Temp greater or equal to 38C (100.4F), Mild Pain (1 - 3), Moderate Pain (4 - 6)  aluminum hydroxide/magnesium hydroxide/simethicone Suspension 30 milliLiter(s) Oral every 4 hours PRN Dyspepsia  chlordiazePOXIDE 50 milliGRAM(s) Oral every 4 hours PRN CIWA-Ar score 8 or greater (first line)  hydrOXYzine hydrochloride 25 milliGRAM(s) Oral every 6 hours PRN Anxiety  LORazepam     Tablet 2 milliGRAM(s) Oral every 6 hours PRN CIWA-Ar score increase by 2 points and a total score of 7 or less  LORazepam     Tablet 2 milliGRAM(s) Oral every 6 hours PRN CIWA-Ar score 8 or greater (second line)  nicotine  Polacrilex Gum 4 milliGRAM(s) Oral every 4 hours PRN nicotine withdrawal

## 2022-03-02 NOTE — BH PSYCHOLOGY - ADMISSION/NEUROPSYCHOLOGY NOTE - SUMMARY
Patient is a 22 yo M from [place] recently enrolled at [employer] with a history of depression and anxiety admitted following an interrupted suicide attempt last week. He reports a 2 year history of depression poorly managed with Lexapro and therapy and a lifelong history of anxiety. Patient endorses worsening depression over the past 2-3 months and passive and active SI since 05/2021 with no prior attempts. He has a history of heavy marijuana use and current marijuana and alcohol use.   Pt's SI is driven by his depression and anxiety, both of which seem exacerbated by his feelings regarding a recent breakup with ex-girlfriend of 4.5y and stress related to school work. Pt appears to have difficulty with emotion regulation, resorting to substance use and SI as coping mechanisms. These difficulties are likely in part rooted in pt's complicated relationship with his parents since childhood.

## 2022-03-02 NOTE — BH INPATIENT PSYCHIATRY ASSESSMENT NOTE - RISK ASSESSMENT
Pt's risk factors include social isolation, substance use, academic pressure, 1 aborted attempt, male gender.  Protective factors include family support, educational status, insight, resourcefulness.

## 2022-03-02 NOTE — BH INPATIENT PSYCHIATRY ASSESSMENT NOTE - DETAILS
paternal grandfather - bipolar disorder, suicide attempts x3  maternal grandfather - substance abuse (opioids, alcohol), OCD as above

## 2022-03-02 NOTE — BH INPATIENT PSYCHIATRY ASSESSMENT NOTE - DESCRIPTION
Lived with parents and brother (16y) in [place] until 09/2021 when he moved to [facility] to attend [employer] for [employer]; single, never , no children.

## 2022-03-02 NOTE — BH PSYCHOLOGY - ADMISSION/NEUROPSYCHOLOGY NOTE - NSBHSUICRISKFACTOR_PSY_A_CORE
Hopelessness/Substance abuse/dependence/Perceived burden on family and others/Family history of suicide/Impulsivity

## 2022-03-02 NOTE — BH TREATMENT PLAN - NSTXDEPRESINTERMD_PSY_ALL_CORE
Psychopharm management 15 minutes daily  Lexapro 20mg, Klonopin 1mg daily, Zyprexa 5mg nightly  Will encourage exploration of his feelings and motivation, developing healthy coping strategies not involving THC or alcohol

## 2022-03-03 LAB
A1C WITH ESTIMATED AVERAGE GLUCOSE RESULT: 5.1 % — SIGNIFICANT CHANGE UP (ref 4–5.6)
CHOLEST SERPL-MCNC: 184 MG/DL — SIGNIFICANT CHANGE UP
ESTIMATED AVERAGE GLUCOSE: 100 MG/DL — SIGNIFICANT CHANGE UP (ref 68–114)
HDLC SERPL-MCNC: 52 MG/DL — SIGNIFICANT CHANGE UP
LIPID PNL WITH DIRECT LDL SERPL: 110 MG/DL — HIGH
NON HDL CHOLESTEROL: 132 MG/DL — HIGH
TRIGL SERPL-MCNC: 112 MG/DL — SIGNIFICANT CHANGE UP

## 2022-03-03 PROCEDURE — 99233 SBSQ HOSP IP/OBS HIGH 50: CPT

## 2022-03-03 RX ADMIN — ESCITALOPRAM OXALATE 20 MILLIGRAM(S): 10 TABLET, FILM COATED ORAL at 10:32

## 2022-03-03 RX ADMIN — Medication 0.5 MILLIGRAM(S): at 10:32

## 2022-03-03 RX ADMIN — OLANZAPINE 5 MILLIGRAM(S): 15 TABLET, FILM COATED ORAL at 21:52

## 2022-03-03 RX ADMIN — Medication 0.5 MILLIGRAM(S): at 21:52

## 2022-03-03 NOTE — BH PSYCHOLOGY - CLINICIAN PSYCHOTHERAPY NOTE - NSBHPSYCHOLNARRATIVE_PSY_A_CORE FT
Pt was given the CAMS. Denied current SI and said "he knows for sure he will never attempt again" because he is too worried for his younger brother, but also admitted to impulsivity when engaging in his SA.   Pt thinks that his SI increased in the context of sadness and guilt related to breaking up with his ex-girlfriend of 4.5y, which in turn made it difficult to handle the stress of school work and deadlines. Pt broke up with his girlfriend because he was depressed and thought that it meant the relationship was not making him happy. Pt regrets his decision and has reached out to ex-gf multiple times, and she requested he stops contacting her, which contributed to the development of his SI. Pt believes that he would be able to handle the stress of school if he did not have to deal with his feelings related to the breakup. Pt has started dating again recently, following the advice of his psychiatrist, but he finds that he is still attached to his ex-gf and he has a difficult time connecting with other people.    Pt also expressed wanting to be discharged ASAP, he feels safe on the unit but does not feel he belongs. Pt thinks he will do better with a female therapist upon discharge.
Pt seen 1:1 in his room to continue CAMS and assess depression.  Pt's CAMS ratings range between 1 (lowest) and 3 out of 5 points for each of the scales; his highest scale is hopelessness (3).  Pt was also given the BDI to assess depression and he obtained a score of 18, which is the "borderline clinical range" suggesting that pt may either have minimized his endorsement of depression sxs or may not be in touch with the extent of his depression. Also possible, pt's anxiety may be in the forefront of his mind. On the BDI, pt mostly endorsed feelings of sadness and guilt, anhedonia, and tiredness.  When discussing his suicidality, pt adamantly denies current SI. He was collaborative in identifying indirect drivers (depression, loneliness/difficulty connecting with others, self-judgment) and direct drivers (breakup, workload) to his SI, as well as bridges that make his more likely to consider suicide (avoidance of emotions in general, and more specifically smoking weed, procrastinating, hopelessness) - full chart of the suicidality drivers/bridges/barriers in pt's file and copy was given to pt.  Pt explains that he considers suicide as a solution to end his hopelessness. He also talked about being "bored" and having existential worries around "fulfilling his potential" and thinking of his purpose. Pt found that cooking for others and making them happy in this way makes him happy, but he thinks that having this as a sole purpose would lead him to lead a "hallow life." Pt has a difficult time thinking of ways to add purpose to his life but is open to explore that in the future, including in therapy.   Pt remains discharge-focused.    MSE: APPEARANCE:  [x] adequately groomed []disheveled  [] malodorous [] Other:  BEHAVIOR: [x] cooperative [] uncooperative [] good EC [] poor EC [] well related [] oddly related [] guarded []PMA [] PMR []abnormal movements [] Other:  SPEED: [x] normal rate/rhythm/volume [] loud [] quiet [] slow  [] rapid [] pressured [] Other:  MOOD: [x] euthymic [] dysphoric [x]anxious [] irritable [] Other:  AFFECT: [] full [] expansive [] constricted [x] blunted [] flat [] stable [] labile [] Other:  THOUGHT PROCESS: [x] organized [] disorganized [] goal-directed [] concrete [x] logical  [] illogical [] circumstantial [] tangential [] impoverished [] effusive [] repetitive [] Other:  THOUGHT CONTENT: [x] negative for delusions/suicidal ideation /homicidal ideation  [] positive for delusions/suicidal ideation/homicidal ideation. Describe:  PERCEPTION: [x] negative for auditory/ visual hallucinations  [] positive for auditory/ visual hallucinations. Describe:  INSIGHT/JUDGMENT: [x] good []fair [] poor    IMPULSE CONTROL: [x] good []fair [] poor  COGNITION: [x] alert and oriented to person,time,place Lacks orientation to person/ time/ place. Describe:  Risk assessment as applicable :  [x] suicide [] self-harm [] elopement [] aggression [] Other:  [] ideation	 [] intent	 [] plan  [] prior incidences (if checked, elaborate) history of prior attempts  [] family history of suicide	[] family history of aggression    Static factors: recent SA, hx of depression and anxiety, hx of substance use, FPHx of bipolar and substance use and SAs  Modifiable factors: current mood episode, current mood episode, anxiety, lack of coping skills, strained relationship with parents  Protective factors: responsibility towards family (brother), engagement in school, future oriented

## 2022-03-03 NOTE — BH INPATIENT PSYCHIATRY PROGRESS NOTE - NSBHCHARTREVIEWVS_PSY_A_CORE FT
Vital Signs Last 24 Hrs  T(C): 36.4 (03-03-22 @ 08:30), Max: 36.7 (03-02-22 @ 16:34)  T(F): 97.6 (03-03-22 @ 08:30), Max: 98.1 (03-02-22 @ 16:34)  HR: 71 (03-03-22 @ 08:30) (63 - 86)  BP: 125/75 (03-03-22 @ 08:30) (104/43 - 125/75)  BP(mean): --  RR: 18 (03-03-22 @ 08:30) (18 - 18)  SpO2: 98% (03-03-22 @ 08:30) (98% - 100%)     Vital Signs Last 24 Hrs  T(C): --  T(F): --  HR: --  BP: --  BP(mean): --  RR: --  SpO2: --

## 2022-03-03 NOTE — BH INPATIENT PSYCHIATRY PROGRESS NOTE - PRN MEDS
MEDICATIONS  (PRN):  acetaminophen     Tablet .. 650 milliGRAM(s) Oral every 6 hours PRN Temp greater or equal to 38C (100.4F), Mild Pain (1 - 3), Moderate Pain (4 - 6)  aluminum hydroxide/magnesium hydroxide/simethicone Suspension 30 milliLiter(s) Oral every 4 hours PRN Dyspepsia  chlordiazePOXIDE 50 milliGRAM(s) Oral every 4 hours PRN CIWA-Ar score 8 or greater (first line)  hydrOXYzine hydrochloride 25 milliGRAM(s) Oral every 6 hours PRN Anxiety  LORazepam     Tablet 2 milliGRAM(s) Oral every 6 hours PRN CIWA-Ar score increase by 2 points and a total score of 7 or less  LORazepam     Tablet 2 milliGRAM(s) Oral every 6 hours PRN CIWA-Ar score 8 or greater (second line)  nicotine  Polacrilex Gum 4 milliGRAM(s) Oral every 4 hours PRN nicotine withdrawal   MEDICATIONS  (PRN):

## 2022-03-03 NOTE — BH INPATIENT PSYCHIATRY PROGRESS NOTE - CURRENT MEDICATION
MEDICATIONS  (STANDING):  clonazePAM  Tablet 0.5 milliGRAM(s) Oral two times a day  escitalopram 20 milliGRAM(s) Oral daily  influenza   Vaccine 0.5 milliLiter(s) IntraMuscular once  OLANZapine 5 milliGRAM(s) Oral at bedtime    MEDICATIONS  (PRN):  acetaminophen     Tablet .. 650 milliGRAM(s) Oral every 6 hours PRN Temp greater or equal to 38C (100.4F), Mild Pain (1 - 3), Moderate Pain (4 - 6)  aluminum hydroxide/magnesium hydroxide/simethicone Suspension 30 milliLiter(s) Oral every 4 hours PRN Dyspepsia  chlordiazePOXIDE 50 milliGRAM(s) Oral every 4 hours PRN CIWA-Ar score 8 or greater (first line)  hydrOXYzine hydrochloride 25 milliGRAM(s) Oral every 6 hours PRN Anxiety  LORazepam     Tablet 2 milliGRAM(s) Oral every 6 hours PRN CIWA-Ar score increase by 2 points and a total score of 7 or less  LORazepam     Tablet 2 milliGRAM(s) Oral every 6 hours PRN CIWA-Ar score 8 or greater (second line)  nicotine  Polacrilex Gum 4 milliGRAM(s) Oral every 4 hours PRN nicotine withdrawal   MEDICATIONS  (STANDING):    MEDICATIONS  (PRN):

## 2022-03-03 NOTE — BH INPATIENT PSYCHIATRY PROGRESS NOTE - NSBHMETABOLIC_PSY_ALL_CORE_FT
BMI: BMI (kg/m2): 25 (03-01-22 @ 18:40)  HbA1c:   Glucose:   BP: 125/75 (03-03-22 @ 08:30) (100/49 - 134/75)  Lipid Panel:  BMI: BMI (kg/m2): 25 (03-01-22 @ 18:40)  HbA1c: A1C with Estimated Average Glucose Result: 5.1 % (03-03-22 @ 11:24)    Glucose:   BP: 117/74 (03-07-22 @ 09:10) (109/69 - 140/77)  Lipid Panel: Date/Time: 03-03-22 @ 11:24  Cholesterol, Serum: 184  Direct LDL: --  HDL Cholesterol, Serum: 52  Total Cholesterol/HDL Ration Measurement: --  Triglycerides, Serum: 112

## 2022-03-03 NOTE — BH PSYCHOLOGY - CLINICIAN PSYCHOTHERAPY NOTE - NSBHPSYCHOLGOALS_PSY_A_CORE
Assessment/Improve social/vocational/coping skills/Prevent relapse/Psychoeducation
Improve social/vocational/coping skills/Prevent relapse/Psychoeducation

## 2022-03-03 NOTE — BH PSYCHOLOGY - CLINICIAN PSYCHOTHERAPY NOTE - NSBHPSYCHOLINT_PSY_A_CORE
Supportive therapy/other...
Dynamic issues addressed/Supportive therapy/Treatment compliance encouraged

## 2022-03-03 NOTE — BH INPATIENT PSYCHIATRY PROGRESS NOTE - CASE SUMMARY
Pt in college program and is at [employer]. Had suicidal ideation with plan to jump off roof and wrote a note he left for loved ones and roommate. Lexapro 20 mg and zyprexa have not been fully helpful. Pt very much wants discharge from inpatient unit as soon as possible. Has outpt psychiatrist but no therapist. Recent breakup, and stresses at school have been stressors.  Pt attending groups, talking to therapist, and cooperating with team. Denies SI. Exercising on unit. Wants to be discharged as soon as possible. No signs of psychosis so I am not sure why he is on zyprexa. Have not yet gotten collateral from [facility] psychiatrist, Dr. Huerta.

## 2022-03-04 PROCEDURE — 99232 SBSQ HOSP IP/OBS MODERATE 35: CPT

## 2022-03-04 RX ORDER — ESCITALOPRAM OXALATE 10 MG/1
1 TABLET, FILM COATED ORAL
Qty: 0 | Refills: 0 | DISCHARGE
Start: 2022-03-04

## 2022-03-04 RX ORDER — CLONAZEPAM 1 MG
1 TABLET ORAL
Qty: 0 | Refills: 0 | DISCHARGE
Start: 2022-03-04

## 2022-03-04 RX ORDER — OLANZAPINE 15 MG/1
1 TABLET, FILM COATED ORAL
Qty: 0 | Refills: 0 | DISCHARGE
Start: 2022-03-04

## 2022-03-04 RX ADMIN — OLANZAPINE 5 MILLIGRAM(S): 15 TABLET, FILM COATED ORAL at 21:23

## 2022-03-04 RX ADMIN — ESCITALOPRAM OXALATE 20 MILLIGRAM(S): 10 TABLET, FILM COATED ORAL at 10:28

## 2022-03-04 RX ADMIN — Medication 0.5 MILLIGRAM(S): at 21:22

## 2022-03-04 RX ADMIN — Medication 0.5 MILLIGRAM(S): at 10:26

## 2022-03-04 NOTE — BH INPATIENT PSYCHIATRY DISCHARGE NOTE - NSBHSUICIDESTATUS_PSY_ALL_CORE
Pt denying suicidal ideation at present. Mood and anxiety symptoms alleviated significantly. Engaged in treatment, motivated and future-oriented.

## 2022-03-04 NOTE — BH INPATIENT PSYCHIATRY PROGRESS NOTE - NSBHMETABOLIC_PSY_ALL_CORE_FT
BMI: BMI (kg/m2): 25 (03-01-22 @ 18:40)  HbA1c: A1C with Estimated Average Glucose Result: 5.1 % (03-03-22 @ 11:24)    Glucose:   BP: 107/60 (03-04-22 @ 09:25) (100/- - 132/79)  Lipid Panel: Date/Time: 03-03-22 @ 11:24  Cholesterol, Serum: 184  Direct LDL: --  HDL Cholesterol, Serum: 52  Total Cholesterol/HDL Ration Measurement: --  Triglycerides, Serum: 112   BMI: BMI (kg/m2): 25 (03-01-22 @ 18:40)  HbA1c: A1C with Estimated Average Glucose Result: 5.1 % (03-03-22 @ 11:24)    Glucose:   BP: 117/74 (03-07-22 @ 09:10) (109/69 - 140/77)  Lipid Panel: Date/Time: 03-03-22 @ 11:24  Cholesterol, Serum: 184  Direct LDL: --  HDL Cholesterol, Serum: 52  Total Cholesterol/HDL Ration Measurement: --  Triglycerides, Serum: 112

## 2022-03-04 NOTE — BH INPATIENT PSYCHIATRY DISCHARGE NOTE - HPI (INCLUDE ILLNESS QUALITY, SEVERITY, DURATION, TIMING, CONTEXT, MODIFYING FACTORS, ASSOCIATED SIGNS AND SYMPTOMS)
Pt admitted to the unit following ED presentation at the recommendation of his [facility] mental health program. He reports that he had a suicide attempt last week - he wrote a note that he left in his dorm room and went to the roof of his building with the intent to jump. Pt's roommate found the note and came to the roof to stop him, but pt also says that he was already hesitant because of the thought of abandoning his brother (15yo). Pt states that his SA was the result of an impulsive, "day-of decision," although he had SI with plan to jump from his building since May 2021 but it has been worse in the past 2 months. In the past, he has made a plan but has no prior attempts. He reports that he once told a close friend of his suicidal intent and plan and the friend "told him not to do it" but did not report to anyone else. The patient denies a history of manic symptoms and auditory or visual hallucinations. He currently feels that he would be safe to leave the hospital and that he would not attempt suicide again. Pt identifies his depression as the main trigger for his SI, which is exacerbated by his sadness and guilt related his breakup with ex-girlfriend of 4.5 y, as well as the stress of workload and deadlines related to school, as elaborated upon below. On the day of the attempt, pt received a text message from a girl he had one date with conveying that they would no longer be in touch, which may have contributed to his impulsive SI. Additionally, pt reports that he tends to ruminate, which also increases his SI.    The patient reports a 2 year history of depression that has been worsening over the past 2-3 months. He has been feeling "numb" with depressed mood, decreased interest levels, low energy, decreased appetite, and difficulty focusing on school and managing school work and deadlines, and reports some moments when his depression is attenuated. He also reports trouble sleeping and that it often takes 1-2 hours for him to fall asleep due to anxiety and "racing thoughts." Patient endorses a history of anxiety for his "whole life" that was worst in high school but is still significant. He reports that his anxiety is worst right when he wakes up and in the evening. He endorses significant social anxiety that causes him to have difficulty connecting with people and to avoid social settings like parties. He started seeing a psychiatrist and therapist in [place] in 05/2021 whom he saw until he moved to [place] for college in 09/2021. The patient was started on Lexapro 5mg 05/2021 and endorses that it helped for 2-3 months but he feels it has stopped working despite dosage increases to 10mg then 20mg. In 05/2021 he was also prescribed Zyprexa for his sleep difficulties, which helps somewhat, and buspirone for his anxiety, which he did not feel was helpful. Upon moving to [place] the patient started seeing a new psychiatrist who continued the Lexapro and Zyprexa and discontinued the buspirone. He did not seek a new therapist as he did not enjoy his sessions in [place] and felt they made his anxiety and depression worse, though he has attended counseling with his college counseling program. It was the [facility] counseling program who recommended that he come to the hospital he believes for "liability reasons."    Patient reports marijuana and alcohol use since high school. Before 01/2022 this fall he was smoking marijuana 6-7x/day including when he first woke up and to help him sleep. He has decreased his use to 1x/day at night. He reports drinking 6-8oz of vodka/night, which makes him feel better and more open with more energy. Patient usually drinks with other people but states that he would drink alone if no one where there. Patient endorses that he wants to stop using marijuana and alcohol and has talked with his psychiatrist about how his substance use may be affecting his symptoms. Pt reports that he decreased his marijuana use in the last couple of months, which may have affected his SI. His last use was the day prior to his presentation to the ED.    The patient is from [facility] and reports a strained relationship with his parents, whom he describes as "complacent, angry, and only thinking about themselves" and who he believes take their anger out on him. He reports that when he told his parents he made an SA, they were angry at him and did not understand his gesture. He has one younger brother, 16 years old, whom he is very close to and wants to be there to support. He reports that wanting to be there for his brother is one of the main reasons he could not go through with his interrupted suicide attempt. His medical history is significant for 4 concussions (3 of which involved presentation to the ED) - 1 from being hit in the head with a baseball, 2 from playing flag football, and 1 other. Patient also reports that he was "hyperactive" as a child and was evaluated for ADHD but was not given a formal diagnosis and was never treated with medication. His family history is significant for bipolar disorder and 3 suicide attempts in his paternal grandfather, and substance abuse (opioids and alcohol) and OCD in his maternal grandfather. He was not raised in a specific Worship tradition and does not associate with specific Worship or spiritual groups now.    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 Patient endorses that the program is high pressure with a demanding workload - the environment makes it difficult to become close to people in the program who he describes as "superficial and in their own bubble." Though the patient has been receiving good grades, he has been struggling lately to do his work and meet deadlines. 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     Pt denies jennie, AH/VH, hx of trauma

## 2022-03-04 NOTE — BH INPATIENT PSYCHIATRY PROGRESS NOTE - CASE SUMMARY
Pt improving on current regimen of lexapro, zyprexa, and klonopin. No hx of psychotic sx. Zyprexa was started by outpatient psychiatrist for insomnia and anxiety. Team spoke with Dr. Huerta.

## 2022-03-04 NOTE — BH INPATIENT PSYCHIATRY DISCHARGE NOTE - NSDCCPCAREPLAN_GEN_ALL_CORE_FT
PRINCIPAL DISCHARGE DIAGNOSIS  Diagnosis: Severe recurrent major depression  Assessment and Plan of Treatment:       SECONDARY DISCHARGE DIAGNOSES  Diagnosis: Anxiety  Assessment and Plan of Treatment:

## 2022-03-04 NOTE — BH INPATIENT PSYCHIATRY PROGRESS NOTE - NSBHCHARTREVIEWVS_PSY_A_CORE FT
Vital Signs Last 24 Hrs  T(C): 36.4 (03-04-22 @ 09:25), Max: 37 (03-03-22 @ 20:20)  T(F): 97.6 (03-04-22 @ 09:25), Max: 98.6 (03-03-22 @ 20:20)  HR: 73 (03-04-22 @ 09:25) (60 - 73)  BP: 107/60 (03-04-22 @ 09:25) (100/- - 131/64)  BP(mean): --  RR: 18 (03-04-22 @ 09:25) (18 - 18)  SpO2: 99% (03-04-22 @ 09:25) (98% - 99%)     Vital Signs Last 24 Hrs  T(C): --  T(F): --  HR: --  BP: --  BP(mean): --  RR: --  SpO2: --

## 2022-03-04 NOTE — BH INPATIENT PSYCHIATRY DISCHARGE NOTE - NSBHANTIPSYCHOTIC_PSY_ALL_CORE
Circumcision Procedure Note    Patient: Ana Brooks SEX: male  DOA: 2021   YOB: 2019  Age: 1 days  LOS:  LOS: 1 day         Preoperative Diagnosis: Intact foreskin, Parents request circumcision of     Post Procedure Diagnosis: Circumcised male infant    Findings: Normal Genitalia    Specimens Removed: Foreskin    Complications: None    Circumcision consent obtained. Sweet Ease. Mogan used. Tolerated well. Estimated Blood Loss:  Less than 1cc    Petroleum gauze applied. Home care instructions provided by nursing.
Yes...

## 2022-03-04 NOTE — BH INPATIENT PSYCHIATRY DISCHARGE NOTE - CASE SUMMARY
Filled out [facility] Program form clearing patient for returning to school and for returning to his dorm. Discussed at length what patient wanted to do vis-a-vis returning to [employer]. He wants to return to school as soon as possible at a full courseload. I was suggesting he might want to drop a course. Talked about what his birthday plans on 3/18 are. Restaurant with parents in [place] which he is looking forward to.     MSE-Well groomed and related, good EC. -PMR/A Speech: wnl Mood:  Filled out [facility] Program form clearing patient for returning to school and for returning to his dorm. Discussed at length what patient wanted to do vis-a-vis returning to [employer]. He wants to return to school as soon as possible at a full courseload. I was suggesting he might want to drop a course. Talked about what his birthday plans on 3/18 are. Restaurant with parents in [place] which he is looking forward to.     MSE-Well groomed and related, good EC. -PMR/A Speech: wnl Mood: "Good" Affect: full range TP: linear TC: -SI/HI/AH/VH/PI. I&J: fair to good

## 2022-03-04 NOTE — BH INPATIENT PSYCHIATRY DISCHARGE NOTE - NSBHMETABOLIC_PSY_ALL_CORE_FT
BMI: BMI (kg/m2): 25 (03-01-22 @ 18:40)  HbA1c: A1C with Estimated Average Glucose Result: 5.1 % (03-03-22 @ 11:24)    Glucose:   BP: 107/60 (03-04-22 @ 09:25) (100/- - 132/79)  Lipid Panel: Date/Time: 03-03-22 @ 11:24  Cholesterol, Serum: 184  Direct LDL: --  HDL Cholesterol, Serum: 52  Total Cholesterol/HDL Ration Measurement: --  Triglycerides, Serum: 112

## 2022-03-04 NOTE — BH INPATIENT PSYCHIATRY DISCHARGE NOTE - DESCRIPTION
Lived with parents and brother (16y) in [place] until 09/2021 when he moved to [facility] to attend [employer] for [employer]; single, never , no children.

## 2022-03-04 NOTE — BH INPATIENT PSYCHIATRY DISCHARGE NOTE - HOSPITAL COURSE
Mr. Dueñas was admitted to [facility] on 3-2-22. He endorsed continued anxiety and depression, though denied suicidal ideation at time of initial interview. The patient was given personal psychotherapy, and his outpatient medications -- Lexapro 20mg daily, olanzapine 5mg nightly -- were continued. Klonopin was raised from 0.5mg daily to 0.5 BID, with the nightly dose addressing both anxiety and sleep issues. Mr. Dueñas found this adjustment helpful. Over the course of his admission, Mr. Dueñas attended groups and continued to engage in therapy, as well as exercising, reading and meditating. He improved in terms of mood and affect and denied suicidal ideation, with his depressive symptoms alleviating and his anxiety improving. Mr. Dueñas was future-oriented and well-related. He was determined not to be a danger to himself and was discharged back into the community on 3-04-22.   Mr. Dueñas was admitted to [facility] on 3-2-22. He endorsed continued anxiety and depression, though denied suicidal ideation at time of initial interview. The patient was given personal psychotherapy, and his outpatient medications -- Lexapro 20mg daily, olanzapine 5mg nightly -- were continued. Klonopin was raised from 0.5mg daily to 0.5 BID, with the nightly dose addressing both anxiety and sleep issues. Mr. Dueñas found this adjustment helpful. Over the course of his admission, Mr. Dueñas attended groups and continued to engage in therapy, as well as exercising, reading and meditating. He improved in terms of mood and affect and denied suicidal ideation, with his depressive symptoms alleviating and his anxiety improving. Mr. Dueñas was future-oriented and well-related. He was determined not to be a danger to himself and was discharged back into the community on 3-07-22.

## 2022-03-04 NOTE — PSYCHIATRIC REHAB INITIAL EVALUATION - NSBHALCSUBCHOICE_PSY_ALL_CORE
Pt reports current use of alcohol and cannabis; Pt reports daily cannabis since age 17, and daily alcohol use since January

## 2022-03-04 NOTE — BH INPATIENT PSYCHIATRY DISCHARGE NOTE - NSDCMRMEDTOKEN_GEN_ALL_CORE_FT
clonazePAM 0.5 mg oral tablet: 1 tab(s) orally 2 times a day  escitalopram 20 mg oral tablet: 1 tab(s) orally once a day  OLANZapine 5 mg oral tablet: 1 tab(s) orally once a day (at bedtime)   clonazePAM 0.5 mg oral tablet: 1 tab(s) orally 2 times a day MDD:1 mg  clonazePAM 0.5 mg oral tablet: 1 tab(s) orally 2 times a day  escitalopram 20 mg oral tablet: 1 tab(s) orally once a day  escitalopram 20 mg oral tablet: 1 tab(s) orally once a day   OLANZapine 5 mg oral tablet: 1 tab(s) orally once a day (at bedtime)  OLANZapine 5 mg oral tablet: 1 tab(s) orally once a day (at bedtime)

## 2022-03-05 PROCEDURE — 99231 SBSQ HOSP IP/OBS SF/LOW 25: CPT

## 2022-03-05 RX ADMIN — ESCITALOPRAM OXALATE 20 MILLIGRAM(S): 10 TABLET, FILM COATED ORAL at 11:01

## 2022-03-05 RX ADMIN — Medication 4 MILLIGRAM(S): at 12:57

## 2022-03-05 RX ADMIN — Medication 0.5 MILLIGRAM(S): at 11:00

## 2022-03-05 RX ADMIN — Medication 0.5 MILLIGRAM(S): at 21:27

## 2022-03-05 RX ADMIN — OLANZAPINE 5 MILLIGRAM(S): 15 TABLET, FILM COATED ORAL at 21:27

## 2022-03-05 NOTE — BH INPATIENT PSYCHIATRY PROGRESS NOTE - NSTXANXGOAL_PSY_ALL_CORE
Be able to participate in activities despite lingering anxiety/panic
Other...
Be able to participate in activities despite lingering anxiety/panic

## 2022-03-05 NOTE — BH INPATIENT PSYCHIATRY PROGRESS NOTE - PRN MEDS
MEDICATIONS  (PRN):  acetaminophen     Tablet .. 650 milliGRAM(s) Oral every 6 hours PRN Temp greater or equal to 38C (100.4F), Mild Pain (1 - 3), Moderate Pain (4 - 6)  aluminum hydroxide/magnesium hydroxide/simethicone Suspension 30 milliLiter(s) Oral every 4 hours PRN Dyspepsia  hydrOXYzine hydrochloride 25 milliGRAM(s) Oral every 6 hours PRN Anxiety  LORazepam     Tablet 2 milliGRAM(s) Oral every 6 hours PRN CIWA-Ar score increase by 2 points and a total score of 7 or less  LORazepam     Tablet 2 milliGRAM(s) Oral every 6 hours PRN CIWA-Ar score 8 or greater (second line)  nicotine  Polacrilex Gum 4 milliGRAM(s) Oral every 4 hours PRN nicotine withdrawal

## 2022-03-05 NOTE — BH INPATIENT PSYCHIATRY PROGRESS NOTE - NSBHMETABOLIC_PSY_ALL_CORE_FT
BMI: BMI (kg/m2): 25 (03-01-22 @ 18:40)  HbA1c: A1C with Estimated Average Glucose Result: 5.1 % (03-03-22 @ 11:24)    Glucose:   BP: 122/70 (03-05-22 @ 09:57) (100/- - 138/82)  Lipid Panel: Date/Time: 03-03-22 @ 11:24  Cholesterol, Serum: 184  Direct LDL: --  HDL Cholesterol, Serum: 52  Total Cholesterol/HDL Ration Measurement: --  Triglycerides, Serum: 112

## 2022-03-05 NOTE — BH INPATIENT PSYCHIATRY PROGRESS NOTE - CURRENT MEDICATION
MEDICATIONS  (STANDING):  clonazePAM  Tablet 0.5 milliGRAM(s) Oral two times a day  escitalopram 20 milliGRAM(s) Oral daily  influenza   Vaccine 0.5 milliLiter(s) IntraMuscular once  OLANZapine 5 milliGRAM(s) Oral at bedtime    MEDICATIONS  (PRN):  acetaminophen     Tablet .. 650 milliGRAM(s) Oral every 6 hours PRN Temp greater or equal to 38C (100.4F), Mild Pain (1 - 3), Moderate Pain (4 - 6)  aluminum hydroxide/magnesium hydroxide/simethicone Suspension 30 milliLiter(s) Oral every 4 hours PRN Dyspepsia  hydrOXYzine hydrochloride 25 milliGRAM(s) Oral every 6 hours PRN Anxiety  LORazepam     Tablet 2 milliGRAM(s) Oral every 6 hours PRN CIWA-Ar score increase by 2 points and a total score of 7 or less  LORazepam     Tablet 2 milliGRAM(s) Oral every 6 hours PRN CIWA-Ar score 8 or greater (second line)  nicotine  Polacrilex Gum 4 milliGRAM(s) Oral every 4 hours PRN nicotine withdrawal

## 2022-03-05 NOTE — BH INPATIENT PSYCHIATRY PROGRESS NOTE - NSTXSUICIDGOAL_PSY_ALL_CORE
Will identify 1 trigger / stressor that exacerbates suicidal

## 2022-03-05 NOTE — BH INPATIENT PSYCHIATRY PROGRESS NOTE - NSTXDEPRESGOAL_PSY_ALL_CORE
Attend and participate in at least 2 groups daily despite low mood/energy

## 2022-03-05 NOTE — BH INPATIENT PSYCHIATRY PROGRESS NOTE - NSBHFUPINTERVALCCFT_PSY_A_CORE
ongoing symptoms of depression and anxiety

## 2022-03-05 NOTE — BH INPATIENT PSYCHIATRY PROGRESS NOTE - NSBHCHARTREVIEWVS_PSY_A_CORE FT
Vital Signs Last 24 Hrs  T(C): 36.4 (03-05-22 @ 09:57), Max: 37.1 (03-04-22 @ 20:10)  T(F): 97.5 (03-05-22 @ 09:57), Max: 98.7 (03-04-22 @ 20:10)  HR: 70 (03-05-22 @ 09:57) (70 - 76)  BP: 122/70 (03-05-22 @ 09:57) (122/70 - 138/82)  BP(mean): --  RR: 18 (03-05-22 @ 09:57) (18 - 18)  SpO2: 98% (03-05-22 @ 09:57) (98% - 100%)

## 2022-03-05 NOTE — BH INPATIENT PSYCHIATRY PROGRESS NOTE - NSICDXBHPRIMARYDX_PSY_ALL_CORE
Depression, major, severe recurrence   F33.2  

## 2022-03-05 NOTE — BH INPATIENT PSYCHIATRY PROGRESS NOTE - NSDCCRITERIA_PSY_ALL_CORE
mood improvement, resolution of suicidality

## 2022-03-05 NOTE — BH INPATIENT PSYCHIATRY PROGRESS NOTE - NSBHASSESSSUMMFT_PSY_ALL_CORE
Patient is a 22 yo M from [place] recently enrolled at [employer] with a history of depression and anxiety admitted following an interrupted suicide attempt last week. He reports a 2 year history of depression poorly managed with Lexapro and a lifelong history of anxiety. Patient endorses worsening depression over the past 2-3 months and passive and active SI since 05/2021 with no prior attempts. He has a history of heavy marijuana use and current marijuana and alcohol use. Patient presenting with depression and anxious mood with notable psychomotor agitation and discomfort.    3/2:  Patient reports a 2 year history of depression poorly managed with Lexapro and a lifelong history of anxiety. Patient endorses worsening depression over the past 2-3 months and passive and active SI since 05/2021 with no prior attempts. He has a history of heavy marijuana use and current marijuana and alcohol use. Patient presenting with depression and anxious mood with notable psychomotor agitation and discomfort. Open to medication adjustments, such as adding 0.5mg Klonopin at night.    3/3: Patient is discharge-focused and endorses improvement of depressed mood and anxiety with no suicidality. He slept well last night and felt the addition of the evening dose of Klonopin is helpful. Patient remains visibly anxious with somewhat blunted affect.    3/4: Patient continues to improve. Affect much brighter, engaging in self-care and attending groups. Does not appear to be depressed, nor is he endorsing depression. Denying SI since admission. Anxiety improving. DC likely soon.    3/5: steady improvement in symptoms and decreased burden of side effects. Noted to be social with select peers, and with organized linear process. No SI/HI/AVH or psychotic sx.    Plan:  - Continue home Lexapro 20mg daily for depression/anxiety  - Continue home Zyprexa 5mg qhs   - Continue Klonopin 0.5mg BID for anxiety  - disposition planning - pending outpatient f/u appointments  
Patient is a 22 yo M from [place] recently enrolled at [employer] with a history of depression and anxiety admitted following an interrupted suicide attempt last week. He reports a 2 year history of depression poorly managed with Lexapro and a lifelong history of anxiety. Patient endorses worsening depression over the past 2-3 months and passive and active SI since 05/2021 with no prior attempts. He has a history of heavy marijuana use and current marijuana and alcohol use. Patient presenting with depression and anxious mood with notable psychomotor agitation and discomfort.    3/2:  Patient reports a 2 year history of depression poorly managed with Lexapro and a lifelong history of anxiety. Patient endorses worsening depression over the past 2-3 months and passive and active SI since 05/2021 with no prior attempts. He has a history of heavy marijuana use and current marijuana and alcohol use. Patient presenting with depression and anxious mood with notable psychomotor agitation and discomfort. Open to medication adjustments, such as adding 0.5mg Klonopin at night.    3/3: Patient is discharge-focused and endorses improvement of depressed mood and anxiety with no suicidality. He slept well last night and felt the addition of the evening dose of Klonopin is helpful. Patient remains visibly anxious with somewhat blunted affect.    3/4: Patient continues to improve. Affect much brighter, engaging in self-care and attending groups. Does not appear to be depressed, nor is he endorsing depression. Denying SI since admission. Anxiety improving. DC likely soon.    Plan:  - Continue home Lexapro 20mg daily for depression/anxiety  - Continue home Zyprexa 5mg nightly for sleep  - Continue Klonopin 0.5mg BID for anxiety  - DC likely very soon  
Patient is a 22 yo M from [place] recently enrolled at [employer] with a history of depression and anxiety admitted following an interrupted suicide attempt last week. He reports a 2 year history of depression poorly managed with Lexapro and a lifelong history of anxiety. Patient endorses worsening depression over the past 2-3 months and passive and active SI since 05/2021 with no prior attempts. He has a history of heavy marijuana use and current marijuana and alcohol use. Patient presenting with depression and anxious mood with notable psychomotor agitation and discomfort.    3/3: Patient is discharge-focused and endorses improvement of depressed mood and anxiety with no suicidality. He slept well last night and felt the addition of the evening dose of Klonopin is helpful. Patient remains visibly anxious with somewhat blunted affect.    Plan:  - Continue home Lexapro 20mg daily for depression/anxiety  - Continue home Zyprexa 5mg nightly for sleep  - Continue Klonopin 0.5mg BID for anxiety  - Collect collateral from outpatient psychiatrist, family

## 2022-03-05 NOTE — BH INPATIENT PSYCHIATRY PROGRESS NOTE - ADDITIONAL DETAILS / COMMENTS
Psychomotor agitation through duration of interview

## 2022-03-05 NOTE — BH INPATIENT PSYCHIATRY PROGRESS NOTE - NSBHFUPINTERVALHXFT_PSY_A_CORE
Patient was seen at bedside, case discussed with nursing. ASHLEY o/n, no prns aside from nicotine gum. Mood, sleep and appetite are sufficient, and pt noted feeling almost back to his normal self (still feeling a bit sluggish and "sleeping more than I would like" due to med side effects). Remains future oriented and discharge focused. Denies HI/AH/VH. Appears to be motivated to abstain from cannabis use upon discharge.  
Patient was seen at bedside this morning. He said that he feels "pretty good." He has been meditating and exercising as well as attending groups. Mr. Dueñas stated that he feels that engaging in self-care has been helping his mental state. he has also been reading "Frankenstein" and noted that he likes classics and books on art and design. He stated that he is sleeping "fine" and denied side effects from his medications. He reiterated that he finds his nighttime dose of Klonopin helpful. Patient denies HI/AH/VH.    Note: Spoke with outpatient psychiatrist, Dr. Sin ([phone]). She sounded pleased that Mr. Dueñas has made progress while here, noting that when they first met (via telepsych), he was so deeply depressed that she worried he might be displaying prodromal signs. For example, he was not wearing a shirt when they spoke (though he had the camera angled up away from his torso), and he was slightly disorganized, with PMR and so ambivalent that she thought he might be thought-blocked. She therefore started him on olanzapine not only for sleep but "just in case" she needed to stave off psychotic features. Since then he has not endorsed any psychosis (or jennie).  She stated that she is unsure if she can take him back upon discharge, as [facility] has a policy that they only handle patients who have not been hospitalized (for psychiatric purposes) in the last six months.
Patient was seen at bedside this morning and states that he is feeling better. He reports that his mood is not depressed but he "just wants to sleep." Last night he was able to fall asleep within 30 minutes and slept without disruption for 7 hours until he was awoken for breakfast. Patient endorses continued anxiety though he feels it has improved since his presentation. He is adherent to his medication with no complaints of side effects and feels that the addition of an evening dose of Klonopin is helpful. He reports that he is adjusting to the unit but is bored - he did attend group sessions yesterday and was encouraged to attend again today. The patient is adamant that he is ready for discharge and has no persistent suicidality: "I don't have a plan and I don't want to not be around anymore." The patient's plan is to return to school upon discharge, and he feels that he will have adequate support from school counseling services. He desires to be set up with an outpatient therapist. Patient denies HI/AH/VH.

## 2022-03-05 NOTE — BH INPATIENT PSYCHIATRY PROGRESS NOTE - NSBHINPTBILLING_PSY_ALL_CORE
89074 - Inpatient Moderate Complexity
91506 - Inpatient Moderate Complexity
81217 - Inpatient High Complexity

## 2022-03-06 RX ADMIN — OLANZAPINE 5 MILLIGRAM(S): 15 TABLET, FILM COATED ORAL at 21:44

## 2022-03-06 RX ADMIN — Medication 0.5 MILLIGRAM(S): at 21:43

## 2022-03-06 RX ADMIN — Medication 0.5 MILLIGRAM(S): at 10:26

## 2022-03-06 RX ADMIN — ESCITALOPRAM OXALATE 20 MILLIGRAM(S): 10 TABLET, FILM COATED ORAL at 10:30

## 2022-03-07 ENCOUNTER — OUTPATIENT (OUTPATIENT)
Dept: OUTPATIENT SERVICES | Facility: HOSPITAL | Age: 22
LOS: 1 days | Discharge: HOME | End: 2022-03-07
Payer: COMMERCIAL

## 2022-03-07 VITALS
OXYGEN SATURATION: 100 % | TEMPERATURE: 98 F | RESPIRATION RATE: 18 BRPM | DIASTOLIC BLOOD PRESSURE: 74 MMHG | SYSTOLIC BLOOD PRESSURE: 117 MMHG | HEART RATE: 69 BPM

## 2022-03-07 PROBLEM — F41.9 ANXIETY DISORDER, UNSPECIFIED: Chronic | Status: ACTIVE | Noted: 2022-03-01

## 2022-03-07 PROBLEM — S06.0X9A CONCUSSION WITH LOSS OF CONSCIOUSNESS OF UNSPECIFIED DURATION, INITIAL ENCOUNTER: Chronic | Status: ACTIVE | Noted: 2022-03-04

## 2022-03-07 PROCEDURE — 85025 COMPLETE CBC W/AUTO DIFF WBC: CPT

## 2022-03-07 PROCEDURE — 81003 URINALYSIS AUTO W/O SCOPE: CPT

## 2022-03-07 PROCEDURE — 80053 COMPREHEN METABOLIC PANEL: CPT

## 2022-03-07 PROCEDURE — U0005: CPT

## 2022-03-07 PROCEDURE — U0003: CPT

## 2022-03-07 PROCEDURE — 80061 LIPID PANEL: CPT

## 2022-03-07 PROCEDURE — 36415 COLL VENOUS BLD VENIPUNCTURE: CPT

## 2022-03-07 PROCEDURE — 99239 HOSP IP/OBS DSCHRG MGMT >30: CPT

## 2022-03-07 PROCEDURE — 83036 HEMOGLOBIN GLYCOSYLATED A1C: CPT

## 2022-03-07 PROCEDURE — 80307 DRUG TEST PRSMV CHEM ANLYZR: CPT

## 2022-03-07 PROCEDURE — 99285 EMERGENCY DEPT VISIT HI MDM: CPT

## 2022-03-07 RX ORDER — CLONAZEPAM 1 MG
1 TABLET ORAL
Qty: 30 | Refills: 0
Start: 2022-03-07 | End: 2022-03-21

## 2022-03-07 RX ORDER — OLANZAPINE 15 MG/1
1 TABLET, FILM COATED ORAL
Qty: 30 | Refills: 0
Start: 2022-03-07 | End: 2022-04-05

## 2022-03-07 RX ORDER — CLONAZEPAM 1 MG
1 TABLET ORAL
Qty: 15 | Refills: 0
Start: 2022-03-07 | End: 2022-03-21

## 2022-03-07 RX ORDER — ESCITALOPRAM OXALATE 10 MG/1
1 TABLET, FILM COATED ORAL
Qty: 30 | Refills: 0
Start: 2022-03-07 | End: 2022-04-05

## 2022-03-07 RX ADMIN — Medication 0.5 MILLIGRAM(S): at 10:46

## 2022-03-07 RX ADMIN — ESCITALOPRAM OXALATE 20 MILLIGRAM(S): 10 TABLET, FILM COATED ORAL at 10:46

## 2022-03-07 NOTE — BH DISCHARGE NOTE NURSING/SOCIAL WORK/PSYCH REHAB - NSDCADDINFO1FT_PSY_ALL_CORE
You are scheduled for a virtual intake appointment on Wednesday, March 9th, 2022 at 1PM with Dr. Cox & Dr. Cade. This appointment will be virtual via Zoom, and a link will be sent to you via email ahead of time. If you have any questions, please call the clinic at: [phone].

## 2022-03-07 NOTE — BH DISCHARGE NOTE NURSING/SOCIAL WORK/PSYCH REHAB - PATIENT PORTAL LINK FT
You can access the FollowMyHealth Patient Portal offered by [facility] by registering at the following website: [email or web address]. By joining [employer]’s FollowMyHealth portal, you will also be able to view your health information using other applications (apps) compatible with our system.

## 2022-03-07 NOTE — BH DISCHARGE NOTE NURSING/SOCIAL WORK/PSYCH REHAB - NSCDUDCCRISIS_PSY_A_CORE
[facility]  1 (111) [facility] ([phone])  Text "WELL" to 87007  Website: www.[employer]/.[facility] 1 (770) 901-CVSR (4580) Website: www.safehorizon.org/.National Suicide Prevention Lifeline 8 (116) 969-1475/.  [facility]  1 (405) LIFENET (721-6330)/.  [facility]  [address] [place] [address] [phone]   Hours:  Monday through Friday from 9 AM to 3 PM/.  [facility] [facility] of  Affairs - Veterans Crisis Line  7 [phone], Option 1 [facility]  1 (584) [facility] ([phone])  Text "WELL" to 91024  Website: www.[employer]/.National Suicide Prevention Lifeline 1 (623) 935-8690/.  [facility]  1 (450) LIFENET (142-4447)/.  [facility]  [address] [place] [address] [phone]   Hours:  Monday through Friday from 9 AM to 3 PM [facility]  1 (625) [facility] ([phone])  Text "WELL" to 06565  Website: www.[employer]/.[facility]izons 1 (033) 021-TOLA (9202) Website: www.safehorizon.org/.National Suicide Prevention Lifeline 1 (488) 027-8371/.  [facility]  1 (655) LIFENET (601-0099)/.  [facility]  [address] [place] [address] [phone]   Hours:  Monday through Friday from 9 AM to 3 PM/.  [facility].[place] Dept of  Affairs - Veterans Crisis Line  5 [phone], Option 1

## 2022-03-07 NOTE — BH DISCHARGE NOTE NURSING/SOCIAL WORK/PSYCH REHAB - NSDCPRGOAL_PSY_ALL_CORE
Pt has attended select groups since his admission and has engaged fully and been social with select peers. Pt has been open to speaking in individual interactions about recent suicidality leading up to hospitalization. Pt demonstrates moderate range of affect and has been increasingly social with similarly aged peers on the unit. Pt denies current SI but still appears with somewhat depressed mood. 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 Pt completed a safety plan and received a copy to take with him. Pt responded to all fields on safety plan thoughtfully, and endorses readiness for discharge.

## 2022-03-07 NOTE — BH DISCHARGE NOTE NURSING/SOCIAL WORK/PSYCH REHAB - NSDPDISTO_PSY_ALL_CORE
The patient will return to his [employer] located at [address] [place] [address], [address], [place] [address]./Home

## 2022-03-09 PROCEDURE — 90792 PSYCH DIAG EVAL W/MED SRVCS: CPT | Mod: 95

## 2022-03-10 DIAGNOSIS — R45.851 SUICIDAL IDEATIONS: ICD-10-CM

## 2022-03-10 DIAGNOSIS — F33.2 MAJOR DEPRESSIVE DISORDER, RECURRENT SEVERE WITHOUT PSYCHOTIC FEATURES: ICD-10-CM

## 2022-03-10 DIAGNOSIS — U07.0 VAPING-RELATED DISORDER: ICD-10-CM

## 2022-03-10 DIAGNOSIS — F17.200 NICOTINE DEPENDENCE, UNSPECIFIED, UNCOMPLICATED: ICD-10-CM

## 2022-03-10 DIAGNOSIS — Z72.89 OTHER PROBLEMS RELATED TO LIFESTYLE: ICD-10-CM

## 2022-03-10 DIAGNOSIS — F12.99 CANNABIS USE, UNSPECIFIED WITH UNSPECIFIED CANNABIS-INDUCED DISORDER: ICD-10-CM

## 2022-03-10 DIAGNOSIS — F41.9 ANXIETY DISORDER, UNSPECIFIED: ICD-10-CM

## 2022-03-23 DIAGNOSIS — F32.2 MAJOR DEPRESSIVE DISORDER, SINGLE EPISODE, SEVERE WITHOUT PSYCHOTIC FEATURES: ICD-10-CM

## 2022-05-25 PROCEDURE — 99214 OFFICE O/P EST MOD 30 MIN: CPT | Mod: 95

## 2022-06-17 NOTE — BH INPATIENT PSYCHIATRY PROGRESS NOTE - NSBHCONSBHPROVDETAILS_PSY_A_CORE  FT
outpatient psychiatrist called, unavailable
outpatient psychiatrist called, unavailable
as per prior note

## 2022-06-21 NOTE — BH TREATMENT PLAN - NSBHPRIMARYDX_PSY_ALL_CORE
Spoke with Patient via lnterpreter, after consult with Dr Lyn.    Tentative Surgery goal date: 07/08/2022    Pt given info for plastic surgeon, Pending appointment with Dr Davis 06/29/2022 @ 9am. Pending clearance from PCP. Patient aware and understands to call primary physician and schedule medical clearance within 30 days.  She will plan to make pre-op appt with her PCP as well. Informed patient I will fax pre-op test request to PCP's office.     Patient verbalized understanding of instructions and agreed with plan. Provided my contact information and encouraged to call me back with any questions.      
Depression, major, severe recurrence

## 2022-11-08 NOTE — ED ADULT TRIAGE NOTE - MODE OF ARRIVAL
Contacted patient from [facility] . Patient requests that referral be transferred to the [facility] because she lives in [place]. I explained that I will send that over to them, as I do not schedule for that region. Patient v/u.    Bettye Cline RN  
Walk in

## 2023-09-14 NOTE — ED ADULT NURSE NOTE - WILL THE PATIENT ACCEPT THE PFIZER COVID-19 VACCINE IF ELIGIBLE AND IT IS AVAILABLE?
Advance Care Planning and Advance Directives     You make decisions on a daily basis - decisions about where you want to live, your career, your home, your life. Perhaps one of the most important decisions you face is your choice for future medical care. Take time to talk with your family and your healthcare team and start planning today.  Advance Care Planning is a process that can help you:  Understand possible future healthcare decisions in light of your own experiences  Reflect on those decision in light of your goals and values  Discuss your decisions with those closest to you and the healthcare professionals that care for you  Make a plan by creating a document that reflects your wishes    Surrogate Decision Maker  In the event of a medical emergency, which has left you unable to communicate or to make your own decisions, you would need someone to make decisions for you.  It is important to discuss your preferences for medical treatment with this person while you are in good health.     Qualities of a surrogate decision maker:  Willing to take on this role and responsibility  Knows what you want for future medical care  Willing to follow your wishes even if they don't agree with them  Able to make difficult medical decisions under stressful circumstances    Advance Directives  These are legal documents you can create that will guide your healthcare team and decision maker(s) when needed. These documents can be stored in the electronic medical record.    Living Will - a legal document to guide your care if you have a terminal condition or a serious illness and are unable to communicate. States vary by statute in document names/types, but most forms may include one or more of the following:        -  Directions regarding life-prolonging treatments        -  Directions regarding artificially provided nutrition/hydration        -  Choosing a healthcare decision maker        -  Direction regarding organ/tissue  donation    Durable Power of  for Healthcare - this document names an -in-fact to make medical decisions for you, but it may also allow this person to make personal and financial decisions for you. Please seek the advice of an  if you need this type of document.    **Advance Directives are not required and no one may discriminate against you if you do not sign one.    Medical Orders  Many states allow specific forms/orders signed by your physician to record your wishes for medical treatment in your current state of health. This form, signed in personal communication with your physician, addresses resuscitation and other medical interventions that you may or may not want.      For more information or to schedule a time with a [facility] Advance Care Planning Facilitator contact: [facility] or call [phone] and someone will contact you directly.BMI for Adults  What is BMI?  Body mass index (BMI) is a number that is calculated from a person's weight and height. BMI can help estimate how much of a person's weight is composed of fat. BMI does not measure body fat directly. Rather, it is an alternative to procedures that directly measure body fat, which can be difficult and expensive.  BMI can help identify people who may be at higher risk for certain medical problems.  What are BMI measurements used for?  BMI is used as a screening tool to identify possible weight problems. It helps determine whether a person is obese, overweight, a healthy weight, or underweight.  BMI is useful for:  Identifying a weight problem that may be related to a medical condition or may increase the risk for medical problems.  Promoting changes, such as changes in diet and exercise, to help reach a healthy weight. BMI screening can be repeated to see if these changes are working.  How is BMI calculated?  BMI involves measuring your weight in relation to your height. Both height and weight are measured,  "and the BMI is calculated from those numbers. This can be done either in English ([place] or metric measurements. Note that charts and online BMI calculators are available to help you find your BMI quickly and easily without having to do these calculations yourself.  To calculate your BMI in English ([place] measurements:    Measure your weight in pounds (lb).  Multiply the number of pounds by 703.  For example, for a person who weighs 180 lb, multiply that number by 703, which equals 126,540.  Measure your height in inches. Then multiply that number by itself to get a measurement called \"inches squared.\"  For example, for a person who is 70 inches tall, the \"inches squared\" measurement is 70 inches x 70 inches, which equals 4,900 inches squared.  Divide the total from step 2 (number of lb x 703) by the total from step 3 (inches squared): 126,540 ÷ 4,900 = 25.8. This is your BMI.    To calculate your BMI in metric measurements:  Measure your weight in kilograms (kg).  Measure your height in meters (m). Then multiply that number by itself to get a measurement called \"meters squared.\"  For example, for a person who is 1.75 m tall, the \"meters squared\" measurement is 1.75 m x 1.75 m, which is equal to 3.1 meters squared.  Divide the number of kilograms (your weight) by the meters squared number. In this example: 70 ÷ 3.1 = 22.6. This is your BMI.  What do the results mean?  BMI charts are used to identify whether you are underweight, normal weight, overweight, or obese. The following guidelines will be used:  Underweight: BMI less than 18.5.  Normal weight: BMI between 18.5 and 24.9.  Overweight: BMI between 25 and 29.9.  Obese: BMI of 30 or above.  Keep these notes in mind:  Weight includes both fat and muscle, so someone with a muscular build, such as an athlete, may have a BMI that is higher than 24.9. In cases like these, BMI is not an accurate measure of body fat.  To determine if excess body fat is the cause of a BMI " "of 25 or higher, further assessments may need to be done by a health care provider.  BMI is usually interpreted in the same way for men and women.  Where to find more information  For more information about BMI, including tools to quickly calculate your BMI, go to these websites:  Centers for Disease Control and Prevention: www.cdc.gov  American Heart Association: www.heart.org  National Heart, Lung, and Blood [place]: www.nhlbi.nih.gov  Summary  Body mass index (BMI) is a number that is calculated from a person's weight and height.  BMI may help estimate how much of a person's weight is composed of fat. BMI can help identify those who may be at higher risk for certain medical problems.  BMI can be measured using English measurements or metric measurements.  BMI charts are used to identify whether you are underweight, normal weight, overweight, or obese.  This information is not intended to replace advice given to you by your health care provider. Make sure you discuss any questions you have with your health care provider.  Document Revised: 09/09/2020 Document Reviewed: 07/17/2020  [employer] Patient Education © 2021 [employer] https://www.nhlbi.nih.gov/files/docs/public/heart/dash_brief.pdf\">   DASH Eating Plan  DASH stands for Dietary Approaches to Stop Hypertension. The DASH eating plan is a healthy eating plan that has been shown to:  Reduce high blood pressure (hypertension).  Reduce your risk for type 2 diabetes, heart disease, and stroke.  Help with weight loss.  What are tips for following this plan?  Reading food labels  Check food labels for the amount of salt (sodium) per serving. Choose foods with less than 5 percent of the Daily Value of sodium. Generally, foods with less than 300 milligrams (mg) of sodium per serving fit into this eating plan.  To find whole grains, look for the word \"whole\" as the first word in the ingredient list.  Shopping  Buy products labeled as \"low-sodium\" or \"no salt " "added.\"  Buy fresh foods. Avoid canned foods and pre-made or frozen meals.  Cooking  Avoid adding salt when cooking. Use salt-free seasonings or herbs instead of table salt or sea salt. Check with your health care provider or pharmacist before using salt substitutes.  Do not jones foods. Cook foods using healthy methods such as baking, boiling, grilling, roasting, and broiling instead.  Cook with heart-healthy oils, such as olive, canola, avocado, soybean, or sunflower oil.  Meal planning    Eat a balanced diet that includes:  4 or more servings of fruits and 4 or more servings of vegetables each day. Try to fill one-half of your plate with fruits and vegetables.  6-8 servings of whole grains each day.  Less than 6 oz (170 g) of lean meat, poultry, or fish each day. A 3-oz (85-g) serving of meat is about the same size as a deck of cards. One egg equals 1 oz (28 g).  2-3 servings of low-fat dairy each day. One serving is 1 cup (237 mL).  1 serving of nuts, seeds, or beans 5 times each week.  2-3 servings of heart-healthy fats. Healthy fats called omega-3 fatty acids are found in foods such as walnuts, flaxseeds, fortified milks, and eggs. These fats are also found in cold-water fish, such as sardines, salmon, and mackerel.  Limit how much you eat of:  Canned or prepackaged foods.  Food that is high in trans fat, such as some fried foods.  Food that is high in saturated fat, such as fatty meat.  Desserts and other sweets, sugary drinks, and other foods with added sugar.  Full-fat dairy products.  Do not salt foods before eating.  Do not eat more than 4 egg yolks a week.  Try to eat at least 2 vegetarian meals a week.  Eat more home-cooked food and less restaurant, buffet, and fast food.    Lifestyle  When eating at a restaurant, ask that your food be prepared with less salt or no salt, if possible.  If you drink alcohol:  Limit how much you use to:  0-1 drink a day for women who are not pregnant.  0-2 drinks a day for " men.  Be aware of how much alcohol is in your drink. In the [place] one drink equals one 12 oz bottle of beer (355 mL), one 5 oz glass of wine (148 mL), or one 1½ oz glass of hard liquor (44 mL).  General information  Avoid eating more than 2,300 mg of salt a day. If you have hypertension, you may need to reduce your sodium intake to 1,500 mg a day.  Work with your health care provider to maintain a healthy body weight or to lose weight. Ask what an ideal weight is for you.  Get at least 30 minutes of exercise that causes your heart to beat faster (aerobic exercise) most days of the week. Activities may include walking, swimming, or biking.  Work with your health care provider or dietitian to adjust your eating plan to your individual calorie needs.  What foods should I eat?  Fruits  All fresh, dried, or frozen fruit. Canned fruit in natural juice (without added sugar).  Vegetables  Fresh or frozen vegetables (raw, steamed, roasted, or grilled). Low-sodium or reduced-sodium tomato and vegetable juice. Low-sodium or reduced-sodium tomato sauce and tomato paste. Low-sodium or reduced-sodium canned vegetables.  Grains  Whole-grain or whole-wheat bread. Whole-grain or whole-wheat pasta. Brown rice. Oatmeal. Quinoa. Bulgur. Whole-grain and low-sodium cereals. Martha bread. Low-fat, low-sodium crackers. Whole-wheat flour tortillas.  Meats and other proteins  Skinless chicken or turkey. Ground chicken or turkey. Pork with fat trimmed off. Fish and seafood. Egg whites. Dried beans, peas, or lentils. Unsalted nuts, nut butters, and seeds. Unsalted canned beans. Lean cuts of beef with fat trimmed off. Low-sodium, lean precooked or cured meat, such as sausages or meat loaves.  Dairy  Low-fat (1%) or fat-free (skim) milk. Reduced-fat, low-fat, or fat-free cheeses. Nonfat, low-sodium ricotta or cottage cheese. Low-fat or nonfat yogurt. Low-fat, low-sodium cheese.  Fats and oils  Soft margarine without trans fats. Vegetable oil.  Reduced-fat, low-fat, or light mayonnaise and salad dressings (reduced-sodium). Canola, safflower, olive, avocado, soybean, and sunflower oils. Avocado.  Seasonings and condiments  Herbs. Spices. Seasoning mixes without salt.  Other foods  Unsalted popcorn and pretzels. Fat-free sweets.  The items listed above may not be a complete list of foods and beverages you can eat. Contact a dietitian for more information.  What foods should I avoid?  Fruits  Canned fruit in a light or heavy syrup. Fried fruit. Fruit in cream or butter sauce.  Vegetables  Creamed or fried vegetables. Vegetables in a cheese sauce. Regular canned vegetables (not low-sodium or reduced-sodium). Regular canned tomato sauce and paste (not low-sodium or reduced-sodium). Regular tomato and vegetable juice (not low-sodium or reduced-sodium). Pickles. Olives.  Grains  Baked goods made with fat, such as croissants, muffins, or some breads. Dry pasta or rice meal packs.  Meats and other proteins  Fatty cuts of meat. Ribs. Fried meat. Cuellar. Bologna, salami, and other precooked or cured meats, such as sausages or meat loaves. Fat from the back of a pig (fatback). Bratwurst. Salted nuts and seeds. Canned beans with added salt. Canned or smoked fish. Whole eggs or egg yolks. Chicken or turkey with skin.  Dairy  Whole or 2% milk, cream, and half-and-half. Whole or full-fat cream cheese. Whole-fat or sweetened yogurt. Full-fat cheese. Nondairy creamers. Whipped toppings. Processed cheese and cheese spreads.  Fats and oils  Butter. Stick margarine. Lard. Shortening. Ghee. Cuellar fat. Tropical oils, such as coconut, palm kernel, or palm oil.  Seasonings and condiments  Onion salt, garlic salt, seasoned salt, table salt, and sea salt. Worcestershire sauce. Tartar sauce. Barbecue sauce. Teriyaki sauce. Soy sauce, including reduced-sodium. Steak sauce. Canned and packaged gravies. Fish sauce. Oyster sauce. Cocktail sauce. Store-bought horseradish. Ketchup. Mustard.  Meat flavorings and tenderizers. Bouillon cubes. Hot sauces. Pre-made or packaged marinades. Pre-made or packaged taco seasonings. Relishes. Regular salad dressings.  Other foods  Salted popcorn and pretzels.  The items listed above may not be a complete list of foods and beverages you should avoid. Contact a dietitian for more information.  Where to find more information  National Heart, Lung, and Blood [place]: www.nhlbi.nih.gov  American Heart Association: www.heart.org  Academy of Nutrition and Dietetics: www.eatright.org  National Kidney Foundation: www.kidney.org  Summary  The DASH eating plan is a healthy eating plan that has been shown to reduce high blood pressure (hypertension). It may also reduce your risk for type 2 diabetes, heart disease, and stroke.  When on the DASH eating plan, aim to eat more fresh fruits and vegetables, whole grains, lean proteins, low-fat dairy, and heart-healthy fats.  With the DASH eating plan, you should limit salt (sodium) intake to 2,300 mg a day. If you have hypertension, you may need to reduce your sodium intake to 1,500 mg a day.  Work with your health care provider or dietitian to adjust your eating plan to your individual calorie needs.  This information is not intended to replace advice given to you by your health care provider. Make sure you discuss any questions you have with your health care provider.  Document Revised: 11/20/2020 Document Reviewed: 11/20/2020  [employer] Patient Education © 2021 [employer] High-Protein and High-Calorie Diet  Eating high-protein and high-calorie foods can help you to gain weight, heal after an injury, and recover after an illness or surgery. The specific amount of daily protein and calories you need depends on:  Your body weight.  The reason this diet is recommended for you.  What is my plan?  Generally, a high-protein, high-calorie diet involves:  Eating 250-500 extra calories each day.  Making sure that you get enough of your  daily calories from protein. Ask your health care provider how many of your calories should come from protein.  Talk with a health care provider, such as a diet and nutrition specialist (dietitian), about how much protein and how many calories you need each day. Follow the diet as directed by your health care provider.  What are tips for following this plan?    Preparing meals  Add whole milk, half-and-half, or heavy cream to cereal, pudding, soup, or hot cocoa.  Add whole milk to instant breakfast drinks.  Add peanut butter to oatmeal or smoothies.  Add powdered milk to baked goods, smoothies, or milkshakes.  Add powdered milk, cream, or butter to mashed potatoes.  Add cheese to cooked vegetables.  Make whole-milk yogurt parfaits. Top them with granola, fruit, or nuts.  Add cottage cheese to your fruit.  Add avocado, cheese, or both to sandwiches or salads.  Add meat, poultry, or seafood to rice, pasta, casseroles, salads, and soups.  Use mayonnaise when making egg salad, chicken salad, or tuna salad.  Use peanut butter as a dip for vegetables or as a topping for pretzels, celery, or crackers.  Add beans to casseroles, dips, and spreads.  Add pureed beans to sauces and soups.  Replace calorie-free drinks with calorie-containing drinks, such as milk and fruit juice.  Replace water with milk or heavy cream when making foods such as oatmeal, pudding, or cocoa.  General instructions  Ask your health care provider if you should take a nutritional supplement.  Try to eat six small meals each day instead of three large meals.  Eat a balanced diet. In each meal, include one food that is high in protein.  Keep nutritious snacks available, such as nuts, trail mixes, dried fruit, and yogurt.  If you have kidney disease or diabetes, talk with your health care provider about how much protein is safe for you. Too much protein may put extra stress on your kidneys.  Drink your calories. Choose high-calorie drinks and have them  after your meals.  What high-protein foods should I eat?    Vegetables  Soybeans. Peas.  Grains  Quinoa. Bulgur wheat.  Meats and other proteins  Beef, pork, and poultry. Fish and seafood. Eggs. Tofu. Textured vegetable protein (TVP). Peanut butter. Nuts and seeds. Dried beans. Protein powders.  Dairy  Whole milk. Whole-milk yogurt. Powdered milk. Cheese. Cottage Cheese. Eggnog.  Beverages  High-protein supplement drinks. Soy milk.  Other foods  Protein bars.  The items listed above may not be a complete list of high-protein foods and beverages. Contact a dietitian for more options.  What high-calorie foods should I eat?  Fruits  Dried fruit. Fruit leather. Canned fruit in syrup. Fruit juice. Avocado.  Vegetables  Vegetables cooked in oil or butter. Fried potatoes.  Grains  Pasta. Quick breads. Muffins. Pancakes. Ready-to-eat cereal.  Meats and other proteins  Peanut butter. Nuts and seeds.  Dairy  Heavy cream. Whipped cream. Cream cheese. Sour cream. Ice cream. Custard. Pudding.  Beverages  Meal-replacement beverages. Nutrition shakes. Fruit juice. Sugar-sweetened soft drinks.  Seasonings and condiments  Salad dressing. Mayonnaise. Jose sauce. Fruit preserves or jelly. Honey. Syrup.  Sweets and desserts  Cake. Cookies. Pie. Pastries. Candy bars. Chocolate.  Fats and oils  Butter or margarine. Oil. Gravy.  Other foods  Meal-replacement bars.  The items listed above may not be a complete list of high-calorie foods and beverages. Contact a dietitian for more options.  Summary  A high-protein, high-calorie diet can help you gain weight or heal faster after an injury, illness, or surgery.  To increase your protein and calories, add ingredients such as whole milk, peanut butter, cheese, beans, meat, or seafood to meal items.  To get enough extra calories each day, include high-calorie foods and beverages at each meal.  Adding a high-calorie drink or shake can be an easy way to help you get enough calories each day.  Talk with your healthcare provider or dietitian about the best options for you.  This information is not intended to replace advice given to you by your health care provider. Make sure you discuss any questions you have with your health care provider.  Document Revised: 11/30/2018 Document Reviewed: 10/30/2018  [employer] Patient Education © 2021 [employer]    Not applicable

## 2024-09-17 ENCOUNTER — OUTPATIENT (OUTPATIENT)
Dept: OUTPATIENT SERVICES | Facility: HOSPITAL | Age: 24
LOS: 1 days | Discharge: HOME | End: 2024-09-17
Payer: COMMERCIAL

## 2024-09-17 PROCEDURE — 99417 PROLNG OP E/M EACH 15 MIN: CPT

## 2024-09-17 PROCEDURE — 90792 PSYCH DIAG EVAL W/MED SRVCS: CPT

## 2024-09-18 NOTE — ED ADULT NURSE NOTE - TEMPLATE
DISCHARGE SUMMARY:   Patient was seen and examined. Diagnosis, treatment, treatment options, and possible complications of today's illness discussed and explained to patient. Patient to take medication/s associated with this visit. Patient may also take OTC analgesic/antipyretic if needed for pain/fever. Advised to increase oral fluid intake. Advised steam inhalation if needed to relieve congestion. Advised warm saline gargle if needed to relieve throat discomfort. Advised Listerine antiseptic mouthwash gargle TID. Patient may use Cepacol oral spray as needed to relieve throat discomfort. Patient was advised to discard the old toothbrush and use a new toothbrush beginning on the third of antibiotics. Advised to avoid ear manipulation / cleaning. Advised to avoid submerging on water. Advised to come back if with worsening or persistent symptoms. Patient verbalized understanding of plan of care.    Patient to come back in 7 - 10 days if needed for worsening symptoms.    
Psych/Behavioral

## 2024-10-09 DIAGNOSIS — F41.1 GENERALIZED ANXIETY DISORDER: ICD-10-CM

## 2024-10-09 DIAGNOSIS — F32.2 MAJOR DEPRESSIVE DISORDER, SINGLE EPISODE, SEVERE WITHOUT PSYCHOTIC FEATURES: ICD-10-CM

## 2024-10-17 DIAGNOSIS — F12.20 CANNABIS DEPENDENCE, UNCOMPLICATED: ICD-10-CM

## 2024-10-17 DIAGNOSIS — F10.20 ALCOHOL DEPENDENCE, UNCOMPLICATED: ICD-10-CM

## 2025-01-07 PROCEDURE — 90833 PSYTX W PT W E/M 30 MIN: CPT | Mod: 95

## 2025-01-07 PROCEDURE — 99214 OFFICE O/P EST MOD 30 MIN: CPT | Mod: 95

## 2025-03-05 PROCEDURE — 90833 PSYTX W PT W E/M 30 MIN: CPT

## 2025-03-05 PROCEDURE — 99214 OFFICE O/P EST MOD 30 MIN: CPT

## 2025-03-10 PROCEDURE — 90836 PSYTX W PT W E/M 45 MIN: CPT

## 2025-03-10 PROCEDURE — 99214 OFFICE O/P EST MOD 30 MIN: CPT

## 2025-03-25 PROCEDURE — 99214 OFFICE O/P EST MOD 30 MIN: CPT | Mod: 95

## 2025-03-25 PROCEDURE — 90836 PSYTX W PT W E/M 45 MIN: CPT | Mod: 95

## 2025-04-03 PROCEDURE — 99214 OFFICE O/P EST MOD 30 MIN: CPT

## 2025-04-03 PROCEDURE — 90836 PSYTX W PT W E/M 45 MIN: CPT

## 2025-04-08 PROCEDURE — 90836 PSYTX W PT W E/M 45 MIN: CPT | Mod: 93

## 2025-04-08 PROCEDURE — 99214 OFFICE O/P EST MOD 30 MIN: CPT | Mod: 95

## 2025-04-16 PROCEDURE — 99214 OFFICE O/P EST MOD 30 MIN: CPT

## 2025-04-16 PROCEDURE — 90836 PSYTX W PT W E/M 45 MIN: CPT
